# Patient Record
Sex: MALE | Race: WHITE | NOT HISPANIC OR LATINO | URBAN - METROPOLITAN AREA
[De-identification: names, ages, dates, MRNs, and addresses within clinical notes are randomized per-mention and may not be internally consistent; named-entity substitution may affect disease eponyms.]

---

## 2023-08-03 ENCOUNTER — INPATIENT (INPATIENT)
Facility: HOSPITAL | Age: 73
LOS: 3 days | Discharge: HOME | DRG: 872 | End: 2023-08-07
Attending: HOSPITALIST | Admitting: HOSPITALIST
Payer: MEDICARE

## 2023-08-03 VITALS
SYSTOLIC BLOOD PRESSURE: 129 MMHG | DIASTOLIC BLOOD PRESSURE: 78 MMHG | RESPIRATION RATE: 16 BRPM | TEMPERATURE: 100 F | OXYGEN SATURATION: 96 % | HEART RATE: 86 BPM

## 2023-08-03 LAB
ALBUMIN SERPL ELPH-MCNC: 4.7 G/DL — SIGNIFICANT CHANGE UP (ref 3.3–5)
ALP SERPL-CCNC: 46 U/L — SIGNIFICANT CHANGE UP (ref 40–120)
ALT FLD-CCNC: 19 U/L — SIGNIFICANT CHANGE UP (ref 10–45)
ANION GAP SERPL CALC-SCNC: 17 MMOL/L — SIGNIFICANT CHANGE UP (ref 5–17)
APTT BLD: 29.3 SEC — SIGNIFICANT CHANGE UP (ref 24.5–35.6)
AST SERPL-CCNC: 22 U/L — SIGNIFICANT CHANGE UP (ref 10–40)
BASE EXCESS BLDV CALC-SCNC: -0.8 MMOL/L — SIGNIFICANT CHANGE UP (ref -2–3)
BILIRUB SERPL-MCNC: 1.4 MG/DL — HIGH (ref 0.2–1.2)
BUN SERPL-MCNC: 18 MG/DL — SIGNIFICANT CHANGE UP (ref 7–23)
CA-I SERPL-SCNC: 1.17 MMOL/L — SIGNIFICANT CHANGE UP (ref 1.15–1.33)
CALCIUM SERPL-MCNC: 9.5 MG/DL — SIGNIFICANT CHANGE UP (ref 8.4–10.5)
CHLORIDE BLDV-SCNC: 100 MMOL/L — SIGNIFICANT CHANGE UP (ref 96–108)
CHLORIDE SERPL-SCNC: 100 MMOL/L — SIGNIFICANT CHANGE UP (ref 96–108)
CO2 BLDV-SCNC: 23 MMOL/L — SIGNIFICANT CHANGE UP (ref 22–26)
CO2 SERPL-SCNC: 18 MMOL/L — LOW (ref 22–31)
CREAT SERPL-MCNC: 1.08 MG/DL — SIGNIFICANT CHANGE UP (ref 0.5–1.3)
CRP SERPL-MCNC: 28 MG/L — HIGH (ref 0–4)
EGFR: 72 ML/MIN/1.73M2 — SIGNIFICANT CHANGE UP
GAS PNL BLDV: 131 MMOL/L — LOW (ref 136–145)
GAS PNL BLDV: SIGNIFICANT CHANGE UP
GAS PNL BLDV: SIGNIFICANT CHANGE UP
GLUCOSE BLDV-MCNC: 118 MG/DL — HIGH (ref 70–99)
GLUCOSE SERPL-MCNC: 123 MG/DL — HIGH (ref 70–99)
HCO3 BLDV-SCNC: 22 MMOL/L — SIGNIFICANT CHANGE UP (ref 22–29)
HCT VFR BLD CALC: 41.9 % — SIGNIFICANT CHANGE UP (ref 39–50)
HCT VFR BLDA CALC: 47 % — SIGNIFICANT CHANGE UP (ref 39–51)
HGB BLD CALC-MCNC: 15.5 G/DL — SIGNIFICANT CHANGE UP (ref 12.6–17.4)
HGB BLD-MCNC: 14.9 G/DL — SIGNIFICANT CHANGE UP (ref 13–17)
INR BLD: 1.1 RATIO — SIGNIFICANT CHANGE UP (ref 0.85–1.18)
LACTATE BLDV-MCNC: 1.5 MMOL/L — SIGNIFICANT CHANGE UP (ref 0.5–2)
MCHC RBC-ENTMCNC: 30.5 PG — SIGNIFICANT CHANGE UP (ref 27–34)
MCHC RBC-ENTMCNC: 35.6 GM/DL — SIGNIFICANT CHANGE UP (ref 32–36)
MCV RBC AUTO: 85.7 FL — SIGNIFICANT CHANGE UP (ref 80–100)
PCO2 BLDV: 31 MMHG — LOW (ref 42–55)
PH BLDV: 7.46 — HIGH (ref 7.32–7.43)
PLATELET # BLD AUTO: 213 K/UL — SIGNIFICANT CHANGE UP (ref 150–400)
PO2 BLDV: 57 MMHG — HIGH (ref 25–45)
POTASSIUM BLDV-SCNC: 3.7 MMOL/L — SIGNIFICANT CHANGE UP (ref 3.5–5.1)
POTASSIUM SERPL-MCNC: 4 MMOL/L — SIGNIFICANT CHANGE UP (ref 3.5–5.3)
POTASSIUM SERPL-SCNC: 4 MMOL/L — SIGNIFICANT CHANGE UP (ref 3.5–5.3)
PROT SERPL-MCNC: 7.5 G/DL — SIGNIFICANT CHANGE UP (ref 6–8.3)
PROTHROM AB SERPL-ACNC: 12.1 SEC — SIGNIFICANT CHANGE UP (ref 9.5–13)
RBC # BLD: 4.89 M/UL — SIGNIFICANT CHANGE UP (ref 4.2–5.8)
RBC # FLD: 12.3 % — SIGNIFICANT CHANGE UP (ref 10.3–14.5)
SAO2 % BLDV: 89.5 % — HIGH (ref 67–88)
SODIUM SERPL-SCNC: 135 MMOL/L — SIGNIFICANT CHANGE UP (ref 135–145)
WBC # BLD: 19.9 K/UL — HIGH (ref 3.8–10.5)
WBC # FLD AUTO: 19.9 K/UL — HIGH (ref 3.8–10.5)

## 2023-08-03 PROCEDURE — 71045 X-RAY EXAM CHEST 1 VIEW: CPT | Mod: 26

## 2023-08-03 PROCEDURE — 93971 EXTREMITY STUDY: CPT | Mod: 26,RT

## 2023-08-03 PROCEDURE — 99285 EMERGENCY DEPT VISIT HI MDM: CPT | Mod: GC

## 2023-08-03 PROCEDURE — 73080 X-RAY EXAM OF ELBOW: CPT | Mod: 26,RT

## 2023-08-03 RX ORDER — ACETAMINOPHEN 500 MG
650 TABLET ORAL ONCE
Refills: 0 | Status: COMPLETED | OUTPATIENT
Start: 2023-08-03 | End: 2023-08-03

## 2023-08-03 RX ADMIN — Medication 650 MILLIGRAM(S): at 23:21

## 2023-08-03 NOTE — ED ADULT TRIAGE NOTE - CHIEF COMPLAINT QUOTE
sudden onset right arm pain/swelling with episode of confusion an hour ago lasting approx 5-10 minutes. Son at bedside states patient is still not completely at baseline

## 2023-08-03 NOTE — ED ADULT NURSE NOTE - OBJECTIVE STATEMENT
pt is a 71yo male presenting to the ED complaining of confusion. pt states he experienced an episode of acute confusion around 2045 when he was trying to make a phone call, states he could not remember how to dial the phone to complete the call, states episode only lasted around 10 minutes before he returned to baseline mental status. pt son at bedside states pt was also having difficult time finishing sentences and responding to verbal stimuli with appropriate words, pt has since returned to baseline speech. pt also presents complaining of R forearm/elbow pain, backside of R forearm appears red, warm to touch, sensitive to tactile sensation due to presence of pain, area around elbow appears grossly swollen. code stroke called initially upon pt arrival, code stroke cancelled following MD Cowan and MD Rhodes assessment. pt A&Ox3 gross neuro intact, no difficulty speaking in complete sentences, s1s2 heart sounds heard, pulses x 4, canales x4, abdomen soft nontender nondistended, skin intact. pt denies chest pain, sob, ha, n/v/d, abdominal pain, f/c, urinary symptoms, hematuria. pt denies chest pain, sob, ha, n/v/d, abdominal pain, f/c, urinary symptoms, hematuria

## 2023-08-03 NOTE — ED PROVIDER NOTE - PROGRESS NOTE DETAILS
Darryn Deras, DO PGY-3: Patient signed out to me at 11 PM, patient had a brief episode of word finding difficulty that self resolved and also presented for right arm redness and elbow pain.  Patient with full range of motion of the arm passive and active, neurovascular intact but erythema extends from elbow distally to the anterior forearm that is tender and hyperemic.  Labs showing leukocytosis of nearly 20,000, no DVT in the right upper extremity, chest x-ray and elbow x-ray negative.  Low suspicion for septic joint or septic bursitis.  Likely cellulitis will treat with Ancef.  Patient pending CT imaging and admission for TIA and cellulitis.

## 2023-08-03 NOTE — ED PROVIDER NOTE - PHYSICAL EXAMINATION
GENERAL: Awake, alert, NAD  HEENT: NC/AT, moist mucous membranes, EOMI  LUNGS: CTAB, no wheezes or crackles   CARDIAC: RRR, no m/r/g  ABDOMEN: Soft, non tender, non distended, no rebound, no guarding  BACK: No midline spinal tenderness   EXT: RUE found with edema and erythema from elbow tracking distally towards forearm and hand. Pain with rom however no limited rom. warm and blanching to touch  NEURO: A&Ox3. Moving all extremities.  PSYCH: Normal affect.

## 2023-08-03 NOTE — ED ADULT NURSE NOTE - NSFALLUNIVINTERV_ED_ALL_ED
Bed/Stretcher in lowest position, wheels locked, appropriate side rails in place/Call bell, personal items and telephone in reach/Instruct patient to call for assistance before getting out of bed/chair/stretcher/Non-slip footwear applied when patient is off stretcher/[place] to call system/Physically safe environment - no spills, clutter or unnecessary equipment/Purposeful proactive rounding/Room/bathroom lighting operational, light cord in reach

## 2023-08-03 NOTE — ED PROVIDER NOTE - ATTENDING CONTRIBUTION TO CARE
Hx: from pt and son, pt with RIGHT elbow swelling pain and reddness, worsening today.  No other sxs, just localized to the elbow.  Pt also with episode of confusion with trouble dialing phone and trouble getting his words out when on the phone with his son, the words were clear but did not make sense and son felt that he was "searching for his words." lasted 10min, resolved fully.  No priors like this.  No diplopia, dysphagia, dysarthria, ataxia, focal weakness in arm or leg.     PE: well appearing, nontoxic, no respiratory distress.  NIH=0, normal speech.  RIGHT elbow swollen, tender, red streaking from elbow down forearm and slightly proximal as well, no crepitus.    MDM: R elbow cellulitis, likely bursitis infected, would also consider DVT, less likely osteomyelitis or septic joint, check labs, inflammatory markers, xray elbow, u/s arm r/o DVT.  Also with possible TIA, not TNK candidate, cth, cta head and neck.

## 2023-08-03 NOTE — ED PROVIDER NOTE - OBJECTIVE STATEMENT
73yo male pt who presents to the ED for expressive aphasia which lasted 10 mins. Patient brought in by son who endorses the patient was attempting to dial his daughter during the episode. At the time, the patient had word finding difficulty and difficulty dialing in his phone. The patient recalls event. Of note, patient with RUE edema, erythema and pain involving elbow and forearm. Denies fevers, chills, cp, sob or any other complaints

## 2023-08-03 NOTE — ED PROVIDER NOTE - DISPOSITION TYPE
Patient's caregiver, Veto Chen asks if the referral written on 7/21 for Home Health services from [facility]) be reissued as a generic order (without naming Haylee Pacheco), because the patient's insurer does not cover [facility]). Veto Chen will look for rehab provider who will take patient's insurance. ADMIT

## 2023-08-04 DIAGNOSIS — Z29.9 ENCOUNTER FOR PROPHYLACTIC MEASURES, UNSPECIFIED: ICD-10-CM

## 2023-08-04 DIAGNOSIS — E80.6 OTHER DISORDERS OF BILIRUBIN METABOLISM: ICD-10-CM

## 2023-08-04 DIAGNOSIS — L03.90 CELLULITIS, UNSPECIFIED: ICD-10-CM

## 2023-08-04 DIAGNOSIS — G45.9 TRANSIENT CEREBRAL ISCHEMIC ATTACK, UNSPECIFIED: ICD-10-CM

## 2023-08-04 LAB
A1C WITH ESTIMATED AVERAGE GLUCOSE RESULT: 5.5 % — SIGNIFICANT CHANGE UP (ref 4–5.6)
ALBUMIN SERPL ELPH-MCNC: 3.9 G/DL — SIGNIFICANT CHANGE UP (ref 3.3–5)
ALP SERPL-CCNC: 38 U/L — LOW (ref 40–120)
ALT FLD-CCNC: 14 U/L — SIGNIFICANT CHANGE UP (ref 10–45)
AMMONIA BLD-MCNC: 23 UMOL/L — SIGNIFICANT CHANGE UP (ref 11–55)
ANION GAP SERPL CALC-SCNC: 13 MMOL/L — SIGNIFICANT CHANGE UP (ref 5–17)
APPEARANCE UR: CLEAR — SIGNIFICANT CHANGE UP
AST SERPL-CCNC: 13 U/L — SIGNIFICANT CHANGE UP (ref 10–40)
BACTERIA # UR AUTO: NEGATIVE — SIGNIFICANT CHANGE UP
BASOPHILS # BLD AUTO: 0 K/UL — SIGNIFICANT CHANGE UP (ref 0–0.2)
BASOPHILS # BLD AUTO: 0.04 K/UL — SIGNIFICANT CHANGE UP (ref 0–0.2)
BASOPHILS NFR BLD AUTO: 0 % — SIGNIFICANT CHANGE UP (ref 0–2)
BASOPHILS NFR BLD AUTO: 0.2 % — SIGNIFICANT CHANGE UP (ref 0–2)
BILIRUB SERPL-MCNC: 1.6 MG/DL — HIGH (ref 0.2–1.2)
BILIRUB UR-MCNC: NEGATIVE — SIGNIFICANT CHANGE UP
BUN SERPL-MCNC: 19 MG/DL — SIGNIFICANT CHANGE UP (ref 7–23)
CALCIUM SERPL-MCNC: 8.7 MG/DL — SIGNIFICANT CHANGE UP (ref 8.4–10.5)
CHLORIDE SERPL-SCNC: 103 MMOL/L — SIGNIFICANT CHANGE UP (ref 96–108)
CHOLEST SERPL-MCNC: 148 MG/DL — SIGNIFICANT CHANGE UP
CK SERPL-CCNC: 87 U/L — SIGNIFICANT CHANGE UP (ref 30–200)
CO2 SERPL-SCNC: 20 MMOL/L — LOW (ref 22–31)
COLOR SPEC: SIGNIFICANT CHANGE UP
CREAT SERPL-MCNC: 1.18 MG/DL — SIGNIFICANT CHANGE UP (ref 0.5–1.3)
CULTURE RESULTS: SIGNIFICANT CHANGE UP
DIFF PNL FLD: ABNORMAL
EGFR: 65 ML/MIN/1.73M2 — SIGNIFICANT CHANGE UP
EOSINOPHIL # BLD AUTO: 0 K/UL — SIGNIFICANT CHANGE UP (ref 0–0.5)
EOSINOPHIL # BLD AUTO: 0.02 K/UL — SIGNIFICANT CHANGE UP (ref 0–0.5)
EOSINOPHIL NFR BLD AUTO: 0 % — SIGNIFICANT CHANGE UP (ref 0–6)
EOSINOPHIL NFR BLD AUTO: 0.1 % — SIGNIFICANT CHANGE UP (ref 0–6)
EPI CELLS # UR: 1 /HPF — SIGNIFICANT CHANGE UP
ERYTHROCYTE [SEDIMENTATION RATE] IN BLOOD: 23 MM/HR — HIGH (ref 0–20)
ESTIMATED AVERAGE GLUCOSE: 111 MG/DL — SIGNIFICANT CHANGE UP (ref 68–114)
FOLATE SERPL-MCNC: 8.1 NG/ML — SIGNIFICANT CHANGE UP
GLUCOSE SERPL-MCNC: 123 MG/DL — HIGH (ref 70–99)
GLUCOSE UR QL: NEGATIVE — SIGNIFICANT CHANGE UP
HCT VFR BLD CALC: 37.7 % — LOW (ref 39–50)
HCV AB S/CO SERPL IA: 0.07 S/CO — SIGNIFICANT CHANGE UP (ref 0–0.99)
HCV AB SERPL-IMP: SIGNIFICANT CHANGE UP
HDLC SERPL-MCNC: 38 MG/DL — LOW
HGB BLD-MCNC: 13.4 G/DL — SIGNIFICANT CHANGE UP (ref 13–17)
HYALINE CASTS # UR AUTO: 0 /LPF — SIGNIFICANT CHANGE UP (ref 0–2)
IMM GRANULOCYTES NFR BLD AUTO: 1.8 % — HIGH (ref 0–0.9)
KETONES UR-MCNC: NEGATIVE — SIGNIFICANT CHANGE UP
LACTATE SERPL-SCNC: 1.2 MMOL/L — SIGNIFICANT CHANGE UP (ref 0.5–2)
LEUKOCYTE ESTERASE UR-ACNC: NEGATIVE — SIGNIFICANT CHANGE UP
LIPID PNL WITH DIRECT LDL SERPL: 98 MG/DL — SIGNIFICANT CHANGE UP
LYMPHOCYTES # BLD AUTO: 1.05 K/UL — SIGNIFICANT CHANGE UP (ref 1–3.3)
LYMPHOCYTES # BLD AUTO: 1.4 K/UL — SIGNIFICANT CHANGE UP (ref 1–3.3)
LYMPHOCYTES # BLD AUTO: 5.3 % — LOW (ref 13–44)
LYMPHOCYTES # BLD AUTO: 8.1 % — LOW (ref 13–44)
MAGNESIUM SERPL-MCNC: 1.8 MG/DL — SIGNIFICANT CHANGE UP (ref 1.6–2.6)
MANUAL SMEAR VERIFICATION: SIGNIFICANT CHANGE UP
MCHC RBC-ENTMCNC: 31.1 PG — SIGNIFICANT CHANGE UP (ref 27–34)
MCHC RBC-ENTMCNC: 35.5 GM/DL — SIGNIFICANT CHANGE UP (ref 32–36)
MCV RBC AUTO: 87.5 FL — SIGNIFICANT CHANGE UP (ref 80–100)
MONOCYTES # BLD AUTO: 1.05 K/UL — HIGH (ref 0–0.9)
MONOCYTES # BLD AUTO: 1.53 K/UL — HIGH (ref 0–0.9)
MONOCYTES NFR BLD AUTO: 5.3 % — SIGNIFICANT CHANGE UP (ref 2–14)
MONOCYTES NFR BLD AUTO: 8.9 % — SIGNIFICANT CHANGE UP (ref 2–14)
NEUTROPHILS # BLD AUTO: 13.91 K/UL — HIGH (ref 1.8–7.4)
NEUTROPHILS # BLD AUTO: 17.79 K/UL — HIGH (ref 1.8–7.4)
NEUTROPHILS NFR BLD AUTO: 80.9 % — HIGH (ref 43–77)
NEUTROPHILS NFR BLD AUTO: 89.4 % — HIGH (ref 43–77)
NITRITE UR-MCNC: NEGATIVE — SIGNIFICANT CHANGE UP
NON HDL CHOLESTEROL: 110 MG/DL — SIGNIFICANT CHANGE UP
NRBC # BLD: 0 /100 WBCS — SIGNIFICANT CHANGE UP (ref 0–0)
PH UR: 6 — SIGNIFICANT CHANGE UP (ref 5–8)
PHOSPHATE SERPL-MCNC: 2.6 MG/DL — SIGNIFICANT CHANGE UP (ref 2.5–4.5)
PLAT MORPH BLD: NORMAL — SIGNIFICANT CHANGE UP
PLATELET # BLD AUTO: 170 K/UL — SIGNIFICANT CHANGE UP (ref 150–400)
POTASSIUM SERPL-MCNC: 3.8 MMOL/L — SIGNIFICANT CHANGE UP (ref 3.5–5.3)
POTASSIUM SERPL-SCNC: 3.8 MMOL/L — SIGNIFICANT CHANGE UP (ref 3.5–5.3)
PROT SERPL-MCNC: 6.2 G/DL — SIGNIFICANT CHANGE UP (ref 6–8.3)
PROT UR-MCNC: ABNORMAL
RAPID RVP RESULT: SIGNIFICANT CHANGE UP
RBC # BLD: 4.31 M/UL — SIGNIFICANT CHANGE UP (ref 4.2–5.8)
RBC # FLD: 12.5 % — SIGNIFICANT CHANGE UP (ref 10.3–14.5)
RBC BLD AUTO: NORMAL — SIGNIFICANT CHANGE UP
RBC CASTS # UR COMP ASSIST: 1 /HPF — SIGNIFICANT CHANGE UP (ref 0–4)
SARS-COV-2 RNA SPEC QL NAA+PROBE: SIGNIFICANT CHANGE UP
SODIUM SERPL-SCNC: 136 MMOL/L — SIGNIFICANT CHANGE UP (ref 135–145)
SP GR SPEC: 1.03 — HIGH (ref 1.01–1.02)
SPECIMEN SOURCE: SIGNIFICANT CHANGE UP
TRIGL SERPL-MCNC: 56 MG/DL — SIGNIFICANT CHANGE UP
UROBILINOGEN FLD QL: NEGATIVE — SIGNIFICANT CHANGE UP
VIT B12 SERPL-MCNC: 218 PG/ML — LOW (ref 232–1245)
VIT D25+D1,25 OH+D1,25 PNL SERPL-MCNC: 26.5 PG/ML — SIGNIFICANT CHANGE UP (ref 19.9–79.3)
WBC # BLD: 17.21 K/UL — HIGH (ref 3.8–10.5)
WBC # FLD AUTO: 17.21 K/UL — HIGH (ref 3.8–10.5)
WBC UR QL: 1 /HPF — SIGNIFICANT CHANGE UP (ref 0–5)

## 2023-08-04 PROCEDURE — 93356 MYOCRD STRAIN IMG SPCKL TRCK: CPT

## 2023-08-04 PROCEDURE — 99223 1ST HOSP IP/OBS HIGH 75: CPT | Mod: GC

## 2023-08-04 PROCEDURE — 93306 TTE W/DOPPLER COMPLETE: CPT | Mod: 26

## 2023-08-04 PROCEDURE — 70498 CT ANGIOGRAPHY NECK: CPT | Mod: 26,MA

## 2023-08-04 PROCEDURE — 12345: CPT | Mod: NC,GC

## 2023-08-04 PROCEDURE — 70496 CT ANGIOGRAPHY HEAD: CPT | Mod: 26,MA

## 2023-08-04 PROCEDURE — 70450 CT HEAD/BRAIN W/O DYE: CPT | Mod: 26,MA,XU

## 2023-08-04 RX ORDER — ASPIRIN/CALCIUM CARB/MAGNESIUM 324 MG
325 TABLET ORAL
Refills: 0 | Status: DISCONTINUED | OUTPATIENT
Start: 2023-08-04 | End: 2023-08-07

## 2023-08-04 RX ORDER — LANOLIN ALCOHOL/MO/W.PET/CERES
3 CREAM (GRAM) TOPICAL AT BEDTIME
Refills: 0 | Status: DISCONTINUED | OUTPATIENT
Start: 2023-08-04 | End: 2023-08-07

## 2023-08-04 RX ORDER — SODIUM CHLORIDE 9 MG/ML
1000 INJECTION INTRAMUSCULAR; INTRAVENOUS; SUBCUTANEOUS ONCE
Refills: 0 | Status: COMPLETED | OUTPATIENT
Start: 2023-08-04 | End: 2023-08-04

## 2023-08-04 RX ORDER — ACETAMINOPHEN 500 MG
650 TABLET ORAL EVERY 6 HOURS
Refills: 0 | Status: DISCONTINUED | OUTPATIENT
Start: 2023-08-04 | End: 2023-08-07

## 2023-08-04 RX ORDER — CEFAZOLIN SODIUM 1 G
2000 VIAL (EA) INJECTION EVERY 8 HOURS
Refills: 0 | Status: DISCONTINUED | OUTPATIENT
Start: 2023-08-04 | End: 2023-08-07

## 2023-08-04 RX ORDER — PREGABALIN 225 MG/1
1000 CAPSULE ORAL DAILY
Refills: 0 | Status: DISCONTINUED | OUTPATIENT
Start: 2023-08-04 | End: 2023-08-07

## 2023-08-04 RX ORDER — CEFAZOLIN SODIUM 1 G
2000 VIAL (EA) INJECTION EVERY 8 HOURS
Refills: 0 | Status: DISCONTINUED | OUTPATIENT
Start: 2023-08-04 | End: 2023-08-04

## 2023-08-04 RX ORDER — IBUPROFEN 200 MG
800 TABLET ORAL EVERY 8 HOURS
Refills: 0 | Status: COMPLETED | OUTPATIENT
Start: 2023-08-04 | End: 2023-08-05

## 2023-08-04 RX ORDER — CEFAZOLIN SODIUM 1 G
1000 VIAL (EA) INJECTION EVERY 8 HOURS
Refills: 0 | Status: DISCONTINUED | OUTPATIENT
Start: 2023-08-04 | End: 2023-08-04

## 2023-08-04 RX ORDER — CEFAZOLIN SODIUM 1 G
1000 VIAL (EA) INJECTION ONCE
Refills: 0 | Status: COMPLETED | OUTPATIENT
Start: 2023-08-04 | End: 2023-08-04

## 2023-08-04 RX ORDER — ASPIRIN/CALCIUM CARB/MAGNESIUM 324 MG
81 TABLET ORAL DAILY
Refills: 0 | Status: DISCONTINUED | OUTPATIENT
Start: 2023-08-04 | End: 2023-08-04

## 2023-08-04 RX ADMIN — Medication 650 MILLIGRAM(S): at 20:08

## 2023-08-04 RX ADMIN — Medication 650 MILLIGRAM(S): at 10:50

## 2023-08-04 RX ADMIN — Medication 800 MILLIGRAM(S): at 22:22

## 2023-08-04 RX ADMIN — Medication 81 MILLIGRAM(S): at 11:57

## 2023-08-04 RX ADMIN — Medication 100 MILLIGRAM(S): at 11:58

## 2023-08-04 RX ADMIN — PREGABALIN 1000 MICROGRAM(S): 225 CAPSULE ORAL at 11:58

## 2023-08-04 RX ADMIN — Medication 650 MILLIGRAM(S): at 21:08

## 2023-08-04 RX ADMIN — Medication 800 MILLIGRAM(S): at 21:22

## 2023-08-04 RX ADMIN — SODIUM CHLORIDE 1000 MILLILITER(S): 9 INJECTION INTRAMUSCULAR; INTRAVENOUS; SUBCUTANEOUS at 01:28

## 2023-08-04 RX ADMIN — Medication 100 MILLIGRAM(S): at 21:23

## 2023-08-04 RX ADMIN — Medication 100 MILLIGRAM(S): at 15:10

## 2023-08-04 RX ADMIN — Medication 100 MILLIGRAM(S): at 01:27

## 2023-08-04 NOTE — CONSULT NOTE ADULT - SUBJECTIVE AND OBJECTIVE BOX
Neurology - Consult Note    -  Spectra: [id number] ([facility]), [phone] ([facility])  -    HPI: Patient FERNANDO PARDO is a 72y (1950) RH man with a PMHx significant for HTN on asprin daily who was brought into the hospital for an episode of confusion and right arm pain. Neurology consulted for episode of confusion. Pt was with family and suddenly had word finding difficulty difficulty using his phone, lasting around 10 min. This was not accompanied by slurred speech, facial droop, HA, numbness, weakness, dizziness, change in vision or gait. No prior similar episodes. Patient currently with evolving erythema on RUE with tenderness and edema on elbow. Endorses chills but no fevers (although did not take his temperature. Patient currently back to his baseline.   NIHSS: 0  Baseline mRS: 0    Not a tenecteplase candidate due to presentation outside the window.   Not a candidate for thrombectomy due to no LVO on imaging.       Review of Systems:     All other review of systems is negative unless indicated above.    Allergies:  penicillins (Rash)      PMHx/PSHx/Family Hx: As above, otherwise see below       Social Hx:  No reported use of tobacco, alcohol, or illicit drugs    Medications:  MEDICATIONS  (STANDING):    MEDICATIONS  (PRN):        Home Medications:      Vitals:  T(C): 36.9 (08-04-23 @ 03:23), Max: 38 (08-03-23 @ 22:37)  HR: 68 (08-04-23 @ 03:23) (68 - 87)  BP: 102/64 (08-04-23 @ 03:23) (102/64 - 158/90)  RR: 16 (08-04-23 @ 03:23) (16 - 18)  SpO2: 96% (08-04-23 @ 03:23) (96% - 98%)    Physical Examination:    General - NAD  Cardiovascular - Peripheral pulses palpable, no edema    Neurologic Exam:  Mental status - Awake, Alert, Oriented to person, place, and time. Speech fluent, repetition and naming intact. Follows simple and complex commands. attention, concentration and fund of knowledge intact.     Cranial nerves - PERRL, VFF, EOMI, face sensation (V1-V3) intact LT, No facial asymmetry b/l, hearing grossly intact b/l, palate with symmetric elevation, trapezius 5/5 strength b/l, tongue midline on protrusion with full lateral movement    Motor - Normal bulk and tone throughout. No pronator drift.  Strength testing            Deltoid      Biceps      Triceps     Wrist Extension    Wrist Flexion       R            5                 5               5                     5                              5                        5  L             5                 5               5                     5                              5                       5              Hip Flexion    Hip Extension    Knee Flexion    Knee Extension    Dorsiflexion    Plantar Flexion  R              5                           5                       5                           5                            5                          5  L              5                           5                        5                           5                            5                          5     RUE edema, erythema and pain involving elbow and forearm.  Pain with rom however no limited rom. warm and blanching to touch  neg hoffmans b/l    Sensation - Light touch/temperature intact throughout    DTR's -             Biceps      Triceps     Brachioradialis      Patellar    Ankle    Toes/plantar response  R             2+             2+                  2+                       2+            2+                 Down  L              2+             2+                 2+                        2+           2+                 Down    Coordination - Finger to Nose and heal to shin intact b/l. No tremors appreciated    Gait and station - Normal casual gait. Romberg (-)    Labs:                        14.9   19.90 )-----------( 213      ( 03 Aug 2023 23:14 )             41.9     08-03    135  |  100  |  18  ----------------------------<  123<H>  4.0   |  18<L>  |  1.08    Ca    9.5      03 Aug 2023 23:14    TPro  7.5  /  Alb  4.7  /  TBili  1.4<H>  /  DBili  x   /  AST  22  /  ALT  19  /  AlkPhos  46  08-03    CAPILLARY BLOOD GLUCOSE  129 (04 Aug 2023 02:14)      POCT Blood Glucose.: 129 mg/dL (03 Aug 2023 22:40)    LIVER FUNCTIONS - ( 03 Aug 2023 23:14 )  Alb: 4.7 g/dL / Pro: 7.5 g/dL / ALK PHOS: 46 U/L / ALT: 19 U/L / AST: 22 U/L / GGT: x             PT/INR - ( 03 Aug 2023 23:14 )   PT: 12.1 sec;   INR: 1.10 ratio         PTT - ( 03 Aug 2023 23:14 )  PTT:29.3 sec       Radiology:  CT Head No Cont:  (04 Aug 2023 00:59)    < from: CT Head No Cont (08.04.23 @ 00:59) >  IMPRESSION:    CT HEAD: No acute intra-cranial hemorrhage, mass effect, or midline shift.    CTA BRAIN: Patent intracranial circulation.No proximal stenosis or   aneurysm.    CTA NECK: Patent cervical vasculature. No hemodynamically significant   stenosis by NASCET criteria or dissection.    < end of copied text >   Neurology - Consult Note    -  Spectra: [phone] ([facility]), [phone] ([facility])  -    HPI: Patient FERNANDO PARDO is a 72y (1950) RH man with a PMHx significant for HTN on asprin daily who was brought into the hospital for an episode of confusion and right arm pain. Neurology consulted for episode of confusion. Pt was with family and suddenly had word finding difficulty difficulty using his phone, lasting around 10 min. This was not accompanied by slurred speech, facial droop, HA, numbness, weakness, dizziness, change in vision or gait. No prior similar episodes. Patient currently with evolving erythema on RUE with tenderness and edema on elbow. Endorses chills but no fevers (although did not take his temperature. Patient currently back to his baseline.   NIHSS: 0  Baseline mRS: 0    Not a tenecteplase candidate due to presentation outside the window.   Not a candidate for thrombectomy due to no LVO on imaging.       Review of Systems:     All other review of systems is negative unless indicated above.    Allergies:  penicillins (Rash)      PMHx/PSHx/Family Hx: As above, otherwise see below       Social Hx:  No reported use of tobacco, alcohol, or illicit drugs    Medications:  MEDICATIONS  (STANDING):    MEDICATIONS  (PRN):        Home Medications:      Vitals:  T(C): 36.9 (08-04-23 @ 03:23), Max: 38 (08-03-23 @ 22:37)  HR: 68 (08-04-23 @ 03:23) (68 - 87)  BP: 102/64 (08-04-23 @ 03:23) (102/64 - 158/90)  RR: 16 (08-04-23 @ 03:23) (16 - 18)  SpO2: 96% (08-04-23 @ 03:23) (96% - 98%)    Physical Examination:    General - NAD  Cardiovascular - Peripheral pulses palpable, no edema  Eyes - Fundoscopy not well visualized    Neurologic Exam:  Mental status - Awake, Alert, Oriented to person, place, and time. Speech fluent, repetition and naming intact. Follows simple and complex commands. attention/concentration, recent and remote memory, and fund of knowledge intact.     Cranial nerves - PERRL, VFF, EOMI, face sensation (V1-V3) intact LT, No facial asymmetry b/l, hearing grossly intact b/l, palate with symmetric elevation, trapezius 5/5 strength b/l, tongue midline on protrusion with full lateral movement    Motor - Normal bulk and tone throughout. No pronator drift.  Strength testing            Deltoid      Biceps      Triceps     Wrist Extension    Wrist Flexion       R            5                 5               5                     5                              5                        5  L             5                 5               5                     5                              5                       5              Hip Flexion    Hip Extension    Knee Flexion    Knee Extension    Dorsiflexion    Plantar Flexion  R              5                           5                       5                           5                            5                          5  L              5                           5                        5                           5                            5                          5     RUE edema, erythema and pain involving elbow and forearm.  Pain with rom however no limited rom. warm and blanching to touch  neg hoffmans b/l    Sensation - Light touch/temperature intact throughout    DTR's -             Biceps      Triceps     Brachioradialis      Patellar    Ankle    Toes/plantar response  R             2+             2+                  2+                       2+            2+                 Down  L              2+             2+                 2+                        2+           2+                 Down    Coordination - Finger to Nose and heal to shin intact b/l. No tremors appreciated    Gait and station - Normal casual gait. Romberg (-)    Labs:                        14.9   19.90 )-----------( 213      ( 03 Aug 2023 23:14 )             41.9     08-03    135  |  100  |  18  ----------------------------<  123<H>  4.0   |  18<L>  |  1.08    Ca    9.5      03 Aug 2023 23:14    TPro  7.5  /  Alb  4.7  /  TBili  1.4<H>  /  DBili  x   /  AST  22  /  ALT  19  /  AlkPhos  46  08-03    CAPILLARY BLOOD GLUCOSE  129 (04 Aug 2023 02:14)      POCT Blood Glucose.: 129 mg/dL (03 Aug 2023 22:40)    LIVER FUNCTIONS - ( 03 Aug 2023 23:14 )  Alb: 4.7 g/dL / Pro: 7.5 g/dL / ALK PHOS: 46 U/L / ALT: 19 U/L / AST: 22 U/L / GGT: x             PT/INR - ( 03 Aug 2023 23:14 )   PT: 12.1 sec;   INR: 1.10 ratio         PTT - ( 03 Aug 2023 23:14 )  PTT:29.3 sec       Radiology:  CT Head No Cont:  (04 Aug 2023 00:59)    < from: CT Head No Cont (08.04.23 @ 00:59) >  IMPRESSION:    CT HEAD: No acute intra-cranial hemorrhage, mass effect, or midline shift.    CTA BRAIN: Patent intracranial circulation.No proximal stenosis or   aneurysm.    CTA NECK: Patent cervical vasculature. No hemodynamically significant   stenosis by NASCET criteria or dissection.    < end of copied text >

## 2023-08-04 NOTE — H&P ADULT - NSHPPHYSICALEXAM_GEN_ALL_CORE
PHYSICAL EXAM:   GENERAL: Alert. Not confused. No acute distress. Not thin. Not cachectic. Not obese.  HEAD:  Atraumatic. Normocephalic.  EYES: EOMI. PERRLA. Normal conjunctiva/sclera.  ENT: Neck supple. No JVD. Moist oral mucosa. Not edentulous. No thrush.  LYMPH: Normal supraclavicular/cervical lymph nodes.   CARDIAC: Not tachy, Not erin. Regular rhythm. Not irregularly irregular. S1. S2. No murmur. No rub. No distant heart sounds.  LUNG/CHEST: CTAB. BS equal bilaterally. No wheezes. No rales. No rhonchi.  ABDOMEN: Soft. No tenderness. No distension. No fluid wave. Normal bowel sounds.  BACK: No midline/vertebral tenderness. No flank tenderness.  VASCULAR: +2 b/l radial or ulnar pulses. Palpable DP pulses.  EXTREMITIES:  No clubbing. No cyanosis. No edema. Moving all 4.  NEUROLOGY: A&Ox3. Non-focal exam. Cranial nerves intact. Normal speech. Sensation intact.  PSYCH: Normal behavior. Normal affect.   SKIN: No jaundice. No erythema. No rash/lesion.  ICU Vital Signs Last 24 Hrs  T(C): 36.9 (04 Aug 2023 03:23), Max: 38 (03 Aug 2023 22:37)  T(F): 98.5 (04 Aug 2023 03:23), Max: 100.4 (03 Aug 2023 22:37)  HR: 68 (04 Aug 2023 03:23) (68 - 87)  BP: 102/64 (04 Aug 2023 03:23) (102/64 - 158/90)  BP(mean): --  ABP: --  ABP(mean): --  RR: 16 (04 Aug 2023 03:23) (16 - 18)  SpO2: 96% (04 Aug 2023 03:23) (96% - 98%)    O2 Parameters below as of 04 Aug 2023 03:23  Patient On (Oxygen Delivery Method): room air          I&O's Summary PHYSICAL EXAM:   GENERAL: Alert. Not confused. No acute distress. Not thin. Not cachectic. Not obese.  HEAD:  Atraumatic. Normocephalic.  EYES: EOMI. PERRLA. Normal conjunctiva/sclera.  ENT: Neck supple. No JVD. Moist oral mucosa. Not edentulous. No thrush.  LYMPH: Normal supraclavicular/cervical lymph nodes.   CARDIAC: Not tachy, Not erin. Regular rhythm. Not irregularly irregular. S1. S2. No murmur. No rub. No distant heart sounds.  LUNG/CHEST: CTAB. BS equal bilaterally. No wheezes. No rales. No rhonchi.  ABDOMEN: Soft. No tenderness. No distension. No fluid wave. Normal bowel sounds.  BACK: No midline/vertebral tenderness. No flank tenderness.  VASCULAR: +2 b/l radial or ulnar pulses. Palpable DP pulses.  EXTREMITIES:  No clubbing. No cyanosis. No edema. Moving all 4.  NEUROLOGY: A&Ox3. Non-focal exam. Cranial nerves intact. Normal speech. Sensation intact.  PSYCH: Normal behavior. Normal affect.   SKIN: No jaundice. (+) Area of erythema from mid-forearm to ~2cm proximal to elbow - demarcated by skin marker. Overlying edema affecting elbow, warm to touch and tender to palpation in that area as well.  ICU Vital Signs Last 24 Hrs  T(C): 36.9 (04 Aug 2023 03:23), Max: 38 (03 Aug 2023 22:37)  T(F): 98.5 (04 Aug 2023 03:23), Max: 100.4 (03 Aug 2023 22:37)  HR: 68 (04 Aug 2023 03:23) (68 - 87)  BP: 102/64 (04 Aug 2023 03:23) (102/64 - 158/90)  BP(mean): --  ABP: --  ABP(mean): --  RR: 16 (04 Aug 2023 03:23) (16 - 18)  SpO2: 96% (04 Aug 2023 03:23) (96% - 98%)    O2 Parameters below as of 04 Aug 2023 03:23  Patient On (Oxygen Delivery Method): room air          I&O's Summary PHYSICAL EXAM:   GENERAL: Alert. Not confused. No acute distress. Not thin. Not cachectic. Not obese.  HEAD:  Atraumatic. Normocephalic.  EYES: EOMI. PERRLA. Normal conjunctiva/sclera.  ENT: Neck supple. No JVD. Moist oral mucosa. Not edentulous. No thrush.  LYMPH: Normal supraclavicular/cervical lymph nodes.   CARDIAC: Not tachy, Not erin. Regular rhythm. Not irregularly irregular. S1. S2. No murmur. No rub. No distant heart sounds.  LUNG/CHEST: CTAB. BS equal bilaterally. No wheezes. No rales. No rhonchi.  ABDOMEN: Soft. No tenderness. No distension. No fluid wave. Normal bowel sounds.  BACK: No midline/vertebral tenderness. No flank tenderness.  VASCULAR: +2 b/l radial or ulnar pulses. Palpable DP pulses.  EXTREMITIES:  No clubbing. No cyanosis.  Moving all 4.  NEUROLOGY: A&Ox3. Non-focal exam. Cranial nerves intact. Normal speech. Sensation intact.  PSYCH: Normal behavior. Normal affect.   SKIN: No jaundice. (+) Area of erythema from mid-forearm to ~2cm proximal to elbow - demarcated by skin marker. Overlying edema affecting elbow, warm to touch and tender to palpation in that area as well.  ICU Vital Signs Last 24 Hrs  T(C): 36.9 (04 Aug 2023 03:23), Max: 38 (03 Aug 2023 22:37)  T(F): 98.5 (04 Aug 2023 03:23), Max: 100.4 (03 Aug 2023 22:37)  HR: 68 (04 Aug 2023 03:23) (68 - 87)  BP: 102/64 (04 Aug 2023 03:23) (102/64 - 158/90)  BP(mean): --  ABP: --  ABP(mean): --  RR: 16 (04 Aug 2023 03:23) (16 - 18)  SpO2: 96% (04 Aug 2023 03:23) (96% - 98%)    O2 Parameters below as of 04 Aug 2023 03:23  Patient On (Oxygen Delivery Method): room air          I&O's Summary

## 2023-08-04 NOTE — CONSULT NOTE ADULT - ASSESSMENT
72y (1950) RH man with a PMHx significant for HTN on asprin daily who was brought into the hospital for an episode of confusion and right arm pain. Neurology consulted for episode of confusion. Pt was with family and suddenly had word finding difficulty difficulty using his phone, lasting around 10 min. This was not accompanied by slurred speech, facial droop, HA, numbness, weakness, dizziness, change in vision or gait. No prior similar episodes. Patient currently with evolving erythema on RUE with tenderness and edema on elbow. Endorses chills but no fevers (although did not take his temperature. Patient currently back to his baseline.   NIHSS: 0    Impression: Brief episode of confusion that resolved in the setting of current infection (likely cellulitis). DDX includes TGA possibly 2/2 intense pain or changes in body temperature as a trigger (mean duration of episodes is approximately six hours and most clinical symptoms last a few hours and by definition resolve within 24 hours) vs infectious disturbance. Noted leukocytosis 19.9  Recommendations:  UA neg   MRI brain w/o  Check TSH, vitamin B1, B6, B12, folate, lactate, Vitamin D 25 OH, creatinine kinase, ammonia,   C/w home aspirin  ABX per primary     Case to be seen by attending.  Note incomplete until finalized by attending signature/attestation. 
Patient is a 72 year old male with no past medical history presenting after having difficulty using phone while attempting to contact physician daughter, episode lasting ~10 minutes and was admitted for AMS, rule out stroke and for RUE cellulitis.     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  [facility], Division of Infectious Diseases  [phone]  After 5pm on weekdays and all day on weekends - please call [phone]

## 2023-08-04 NOTE — CONSULT NOTE ADULT - SUBJECTIVE AND OBJECTIVE BOX
[facility] DIVISION OF INFECTIOUS DISEASES  REDD Escamilla S. Shah, Y. Patel, G. [facility]  [phone]  ([phone] - weekdays after 5pm and weekends)    FERNANDO PARDO  72y, Male  [id number]    HPI:  Patient is a 72 year old male with no past medical history presenting after having difficulty using phone while attempting to contact physician daughter, episode lasting ~10 minutes. No dysarthria or weakness at time of episode. Additionally, pt with 1 day history of RUE rash. Rash initially started mid-forearm, painful but non-pruritic in nature, and has progressed proximally towards the elbow. He also notes worsening warmth and edema with the rash. Pt is R handed w/ distant history of tennis-playing w/ R hand (transitioned to L hand following shoulder injury). He endorses chills over the past day and gardens every other day. Pt attributed rash initially to bug bite but is unsure. He denies trauma, fall, sore throat, dysphagia, odynophagia, cough, dyspnea, abdominal pain, nausea, vomiting, urinary symptoms, alternative joint involvement, myalgia.   In ED, VS: 100.4, HR 86, /78, RR 16, O2 96%. Administered ancef, NS bolus x1, tylenol. Code stroke called on arrival to ED, NIHSS of 0 at time. CTH completed w/ microangiopathic changes but no indication of acute ischemic/hemorrhagic stroke.  (04 Aug 2023 06:32)  ROS: 14 point review of systems completed, pertinent positives and negatives as per HPI.    Allergies: penicillins (Rash)  PMH -- No pertinent past medical history  PSH -- No significant past surgical history  FH -- No pertinent family history in first degree relatives  Social History -- denies tobacco, alcohol or illicit drug use    Physical Exam--  Vital Signs Last 24 Hrs  T(F): 98.1 (04 Aug 2023 07:54), Max: 100.4 (03 Aug 2023 22:37)  HR: 63 (04 Aug 2023 07:54) (63 - 87)  BP: 116/75 (04 Aug 2023 07:54) (102/64 - 158/90)  RR: 15 (04 Aug 2023 07:54) (15 - 18)  SpO2: 96% (04 Aug 2023 07:54) (96% - 98%)  General: no acute distress  HEENT: NC/AT, EOMI, anicteric, neck supple  Lungs: Clear bilaterally without rales, wheezing or rhonchi  Heart: S1, S2 present, RRR. No murmur, rub or gallop.  Abdomen: Soft. Nondistended. Nontender. BS present.   Neuro: AAOx3, no obvious focal deficits   Extremities: No cyanosis or clubbing. No edema.   Skin: Warm. Dry. Good turgor. **  Psychiatric: Appropriate affect and mood for situation.   Lines: PIV    Laboratory & Imaging Data--  CBC:                       13.4   17.21 )-----------( 170      ( 04 Aug 2023 07:56 )             37.7     WBC Count: 17.21 K/uL (23 @ 07:56)  WBC Count: 19.90 K/uL (23 @ 23:14)    CMP:     135  |  100  |  18  ----------------------------<  123<H>  4.0   |  18<L>  |  1.08    Ca    9.5      03 Aug 2023 23:14    TPro  7.5  /  Alb  4.7  /  TBili  1.4<H>  /  DBili  x   /  AST  22  /  ALT  19  /  AlkPhos  46  08-03    LIVER FUNCTIONS - ( 03 Aug 2023 23:14 )  Alb: 4.7 g/dL / Pro: 7.5 g/dL / ALK PHOS: 46 U/L / ALT: 19 U/L / AST: 22 U/L / GGT: x           Urinalysis Basic - ( 04 Aug 2023 01:22 )  Color: Light Yellow / Appearance: Clear / S.031 / pH: x  Gluc: x / Ketone: Negative  / Bili: Negative / Urobili: Negative   Blood: x / Protein: Trace / Nitrite: Negative   Leuk Esterase: Negative / RBC: 1 /hpf / WBC 1 /HPF   Sq Epi: x / Non Sq Epi: x / Bacteria: Negative    Microbiology: reviewed  Respiratory Viral Panel with COVID-19 by JACOB (23 @ 23:19)    Rapid RVP Result: NotDetec   SARS-CoV-2: NotDetec: This Respiratory Panel uses polymerase chain reaction (PCR) to detect for  adenovirus; coronavirus (HKU1, NL63, 229E, OC43); human metapneumovirus  (hMPV); human enterovirus/rhinovirus (Entero/RV); influenza A; influenza  A/H1; influenza A/H3; influenza A/H1-2009; influenza B; parainfluenza  viruses 1, 2, 3, 4; respiratory syncytial virus; Mycoplasma pneumoniae;  Chlamydophila pneumoniae; and SARS-CoV-2.    Radiology--reviewed  < from: CT Head No Cont (23 @ 00:59) >  IMPRESSION:    CT HEAD: No acute intra-cranial hemorrhage, mass effect, or midline shift.    CTA BRAIN: Patent intracranial circulation.No proximal stenosis or   aneurysm.    CTA NECK: Patent cervical vasculature. No hemodynamically significant   stenosis by NASCET criteria or dissection.    NASCET CAROTID STENOSIS CRITERIA (distal normal appearing ICA as   denominator for measurement): 0%-none, 1-49%-mild, 50-70%-moderate,   70-89%-severe, 90-99%-critical.    < end of copied text >  < from: VA Duplex Upper Ext Vein Scan, Right (23 @ 23:52) >  IMPRESSION:  No evidence of right upper extremity deep venous thrombosis.    < end of copied text >  < from: Xray Elbow AP + Lateral + Oblique, Right (23 @ 23:19) >  IMPRESSION:  No osseous erosion to suggest acute osteomyelitis. No joint effusion.    < end of copied text >  < from: Xray Chest 1 View AP/PA (23 @ 23:19) >    IMPRESSION:  Clear lungs.    < end of copied text >    Active Medications--  aspirin enteric coated 81 milliGRAM(s) Oral daily  ceFAZolin   IVPB 1000 milliGRAM(s) IV Intermittent every 8 hours  melatonin 3 milliGRAM(s) Oral at bedtime PRN    Current Antimicrobials:   ceFAZolin   IVPB 1000 milliGRAM(s) IV Intermittent every 8 hours    Prior/Completed Antimicrobials:  ceFAZolin   IVPB     [facility] DIVISION OF INFECTIOUS DISEASES  REDD Escamilla S. Shah, Y. Patel, G. [facility]  [phone]  ([phone] - weekdays after 5pm and weekends)    FERNANDO PARDO  72y, Male  [id number]    HPI:  Patient is a 72 year old male with no past medical history presenting after having difficulty using phone while attempting to contact physician daughter, episode lasting ~10 minutes. No dysarthria or weakness at time of episode. Additionally, pt with 1 day history of RUE rash. Rash initially started mid-forearm, painful but non-pruritic in nature, and has progressed proximally towards the elbow. He also notes worsening warmth and edema with the rash. Pt is R handed w/ distant history of tennis-playing w/ R hand (transitioned to L hand following shoulder injury). He endorses chills over the past day and gardens every other day. Pt attributed rash initially to bug bite but is unsure. He denies trauma, fall, sore throat, dysphagia, odynophagia, cough, dyspnea, abdominal pain, nausea, vomiting, urinary symptoms, alternative joint involvement, myalgia.   In ED, VS: 100.4, HR 86, /78, RR 16, O2 96%. Administered ancef, NS bolus x1, tylenol. Code stroke called on arrival to ED, NIHSS of 0 at time. CTH completed w/ microangiopathic changes but no indication of acute ischemic/hemorrhagic stroke.  (04 Aug 2023 06:32)  Patient seen and examined at bedside this morning in the ER. Patient confirms above history. States he does feel mental status is back to baseline, not having difficulty with word finding. He feels RUE erythema and swelling is about the same. Has not had fevers this am. No other complaints. Reports having small healing cuts on fingertips from dry skin. Son at bedside.   ROS: 14 point review of systems completed, pertinent positives and negatives as per HPI.    Allergies: penicillins (Rash)  PMH -- No pertinent past medical history  PSH -- No significant past surgical history  FH -- No pertinent family history in first degree relatives  Social History -- denies tobacco, alcohol or illicit drug use    Physical Exam--  Vital Signs Last 24 Hrs  T(F): 98.1 (04 Aug 2023 07:54), Max: 100.4 (03 Aug 2023 22:37)  HR: 63 (04 Aug 2023 07:54) (63 - 87)  BP: 116/75 (04 Aug 2023 07:54) (102/64 - 158/90)  RR: 15 (04 Aug 2023 07:54) (15 - 18)  SpO2: 96% (04 Aug 2023 07:54) (96% - 98%)  General: no acute distress  HEENT: NC/AT, EOMI, anicteric, neck supple  Lungs: Clear bilaterally without rales, wheezing or rhonchi  Heart: S1, S2 present, RRR. No murmur, rub or gallop.  Abdomen: Soft. Nondistended. Nontender. BS present.   Neuro: AAOx3, no obvious focal deficits   Extremities: No cyanosis or clubbing. No edema.   Skin: Warm. Dry. R forearm erythema to above elbow (mostly within markings) with warmth and mild TTP, no open wounds/lesions; hand with small closed cuts on finger tips with no sign of infection  Psychiatric: Appropriate affect and mood for situation.   Lines: PIV    Laboratory & Imaging Data--  CBC:                       13.4   17.21 )-----------( 170      ( 04 Aug 2023 07:56 )             37.7     WBC Count: 17.21 K/uL (23 @ 07:56)  WBC Count: 19.90 K/uL (23 @ 23:14)    CMP:     135  |  100  |  18  ----------------------------<  123<H>  4.0   |  18<L>  |  1.08    Ca    9.5      03 Aug 2023 23:14    TPro  7.5  /  Alb  4.7  /  TBili  1.4<H>  /  DBili  x   /  AST  22  /  ALT  19  /  AlkPhos  46      LIVER FUNCTIONS - ( 03 Aug 2023 23:14 )  Alb: 4.7 g/dL / Pro: 7.5 g/dL / ALK PHOS: 46 U/L / ALT: 19 U/L / AST: 22 U/L / GGT: x           Urinalysis Basic - ( 04 Aug 2023 01:22 )  Color: Light Yellow / Appearance: Clear / S.031 / pH: x  Gluc: x / Ketone: Negative  / Bili: Negative / Urobili: Negative   Blood: x / Protein: Trace / Nitrite: Negative   Leuk Esterase: Negative / RBC: 1 /hpf / WBC 1 /HPF   Sq Epi: x / Non Sq Epi: x / Bacteria: Negative    Microbiology: reviewed  Respiratory Viral Panel with COVID-19 by JACOB (23 @ 23:19)    Rapid RVP Result: [facility]   SARS-CoV-2: [facility]: This Respiratory Panel uses polymerase chain reaction (PCR) to detect for  adenovirus; coronavirus (HKU1, NL63, 229E, OC43); human metapneumovirus  (hMPV); human enterovirus/rhinovirus (Entero/RV); influenza A; influenza  A/H1; influenza A/H3; influenza A/H1-2009; influenza B; parainfluenza  viruses 1, 2, 3, 4; respiratory syncytial virus; Mycoplasma pneumoniae;  Chlamydophila pneumoniae; and SARS-CoV-2.    Radiology--reviewed  < from: CT Head No Cont (23 @ 00:59) >  IMPRESSION:    CT HEAD: No acute intra-cranial hemorrhage, mass effect, or midline shift.    CTA BRAIN: Patent intracranial circulation.No proximal stenosis or   aneurysm.    CTA NECK: Patent cervical vasculature. No hemodynamically significant   stenosis by NASCET criteria or dissection.    NASCET CAROTID STENOSIS CRITERIA (distal normal appearing ICA as   denominator for measurement): 0%-none, 1-49%-mild, 50-70%-moderate,   70-89%-severe, 90-99%-critical.    < end of copied text >  < from: VA Duplex Upper Ext Vein Scan, Right (23 @ 23:52) >  IMPRESSION:  No evidence of right upper extremity deep venous thrombosis.    < end of copied text >  < from: Xray Elbow AP + Lateral + Oblique, Right (23 @ 23:19) >  IMPRESSION:  No osseous erosion to suggest acute osteomyelitis. No joint effusion.    < end of copied text >  < from: Xray Chest 1 View AP/PA (23 @ 23:19) >    IMPRESSION:  Clear lungs.    < end of copied text >    Active Medications--  aspirin enteric coated 81 milliGRAM(s) Oral daily  ceFAZolin   IVPB 1000 milliGRAM(s) IV Intermittent every 8 hours  melatonin 3 milliGRAM(s) Oral at bedtime PRN    Current Antimicrobials:   ceFAZolin   IVPB 1000 milliGRAM(s) IV Intermittent every 8 hours    Prior/Completed Antimicrobials:  ceFAZolin   IVPB

## 2023-08-04 NOTE — STROKE CODE NOTE - INITIAL PRESENTATION
6.21.18 @ 0804  to call to reschedule her 8.9.108 LT MOI    Lisbet will talk to her  and call back to confirm 8.16.18      RESCHEDULE    SURGERY DATE:   August 16, 2018    Location: [facility]    TIME: Patient aware that a call will come from [facility] 1-3 days prior to surgery    SURGICAL PROCEDURE:  LEFT Total Hip Arthroplasty    Dx: arthritis of left hip    Patient Admission Status: Surgery Admit (Planned Admit to Inpatient)    Reason: dr out      Estimated body mass index is 25.93 kg/m² as calculated from the following:    Height as of 6/18/18: 5' 7\" (1.702 m).    Weight as of 6/18/18: 75.1 kg.    Payor: TRA/SHANEL [employer]HE[employer] CAREGIVER / Plan: [id number] / Product Type: COMM   Specialty Referral Req'd?:  N/A  Secondary?   No  Work Comp?:  No      STAFF MESSAGE TO ESTIMATE DEPT:     PRE OP APPT  -     Lazaro Rivera MD    [place] - Internal Medicine  Phone: [phone]  Fax: [phone]    Patient, or staff to schedule- patient made    · LOCATION:  [facility]  · DATE : 7.23.18  · TIME: 1430  · SURGERY INFO SENT TO PCP: Yes   · LINKED ORDER : Yes  · FAX CONFIRMATION RECEIVED:         POST OP:  DATE: 8.31.18  TIME: 0915  LOCATION:[facility]      TJ DATE (if necessary): 6.28.18    TAMMY APT MADE: Yes    CASE CREATED/ADJUSTED: [facility] [employer] and confirmed with [phone] = Frannie .      STAFF MESSAGE PRE HAB OF CHANGES: Yes  8.9.18 rescheuled to 8.16.18 - [facility]   Received: Today   Message Contents   Rea Jeronimo  P Slm Pmr Hs Sched Msg Pool   Cc: Rea Jeronimo             Please assist in coordinating PREHAB and/or POSTOP REHAB for our Total Joint Replacement patient.     Surgery Date:   August 16, 2018   Surgical Procedure:  LEFT Total Hip Arthroplasty   Surgeon:  Dr. Rich Herndon   Hospital: [facility]     PREHAB needed?  Yes   POSTOP rehab needed?  ?     Thank you!   Rea sevilla [phone]        PRE AUTH STAFF  MESSAGED WITH CHANGES: Yes  8.9.18 rescheduled to 8.16.18 - [facility]   Received: Today   Message Contents   Rea Jeronimo  P Ortho Preauth-Spine/Ortho   Cc: Rea Jeronimo             Surgery/Procedure RESCHEDULED:     Original Date:  8.9.18     New Date:  8.16.18   OR Location: [facility]     Other changes to procedure?  No     Reason for change:  Dr out     Thanks!   Thank you,   Rea at [phone]   Surgery Scheduler for Dr. Rich Herndon   [facility]        J.I.M. ORDERED: Yes      ENC DOUBLE CHECKED: Yes    SURGERY INFO SENT TO PATIENT: Yes - LETTER ONLY   DATE SENT: June 21, 2018       Enclosures:       Staffed messaged Darvin/John  - Notifying of reschedule:     Pre instructions reviewed and understood by patient  Medication questions to be directed by PCP at pre op apt  NPO after midnight regardless of arrival time incase of cancellation than patient is prepped for earlier time       ED

## 2023-08-04 NOTE — H&P ADULT - ATTENDING COMMENTS
72M, no known PMHx (doesn't see doctors too often), presenting w/ 10-15minutes of word-finding difficulties, and confusion (not able to use his own phone).  Pt reported that he was trying to call his daughter, but he was unable to.   Neuro sx resolved fully.   Pt also has swelling on the right elbow area, started about 1 prior to presentation, associated chills at home. He was gardening the day before.   Pt initially came in as code stroke: neuro r/o'd acute stroke with CT head.   Neuro c/s appreciated.     DDx for neurologic sx TIA vs. metabolic encephalopathy 2/2 sepsis d/t cellulitis.    ROS: neg except for HPI above.   PEx significant for no focal neurologic deficit, AAOx4. RUE w/ erythema (area marked) and swelling. Heart exam with RRR, no murmur auscultated.     #Confusion  -DDx: TIA vs. metabolic encephalopathy  -fully resolved now.   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 F/u labs, echo, and telemetry monitoring.   -pt reports he was taking full dose asa w/o doctor's rx, for "prevention". Start asa 81mg.     #Sepsis d/t cellulitis  -significant leukocytosis, fever x1.   -Started on abx.   -f/u cultures  -monitor for improvement and transition to PO when able     #Hyperbilirubinemia  -very mild, no RUQ pain. Unclear significance at this. Monitor for now.     DVT ppx with lovenox  Diet: DASH

## 2023-08-04 NOTE — H&P ADULT - NSHPREVIEWOFSYSTEMS_GEN_ALL_CORE
CONSTITUTIONAL:  (+) Chills. No weight loss, fever, weakness or fatigue.  HEENT:  Eyes:  No visual loss, blurred vision, double vision or yellow sclerae. Ears, Nose, Throat:  No hearing loss, sneezing, congestion, runny nose or sore throat.  SKIN:  (+) RUE erythema. No itching.  CARDIOVASCULAR:  No chest pain, chest pressure or chest discomfort. No palpitations.  RESPIRATORY:  No shortness of breath, cough or sputum.  GASTROINTESTINAL:  No anorexia, nausea, vomiting or diarrhea. No abdominal pain or blood.  GENITOURINARY:  Denies hematuria, dysuria.   NEUROLOGICAL:  No headache, dizziness, syncope, paralysis, ataxia, numbness or tingling in the extremities. No change in bowel or bladder control.  MUSCULOSKELETAL:  No muscle, back pain, joint pain or stiffness.  HEMATOLOGIC:  No anemia, bleeding or bruising.  LYMPHATICS:  No enlarged nodes.   PSYCHIATRIC:  No history of depression or anxiety.  ENDOCRINOLOGIC:  No reports of sweating, cold or heat intolerance. No polyuria or polydipsia.  ALLERGIES:  No history of asthma, hives, eczema or rhinitis.

## 2023-08-04 NOTE — PATIENT PROFILE ADULT - FUNCTIONAL ASSESSMENT - BASIC MOBILITY 1.
Room # 4    Stress Test: 4/17/19    Episodes of Fatigue    Visit Vitals  /62 (BP 1 Location: Left arm, BP Patient Position: Sitting)   Pulse 64   Resp 16   Ht 5' 3\" (1.6 m)   Wt 189 lb 9.6 oz (86 kg)   SpO2 94%   BMI 33.59 kg/m² 4 = No assist / stand by assistance

## 2023-08-04 NOTE — H&P ADULT - PROBLEM SELECTOR PLAN 1
Area of erythema from elbow to distal forearm delineated by skin marker w/ associated edema. WBCs of ~20k.   -F/u blood cultures  -c/w ancef 2g q8h Area of erythema from elbow to distal forearm delineated by skin marker w/ associated edema. WBCs of ~20k.   -F/u blood cultures  -c/w ancef 1g q8h Area of erythema from elbow to distal forearm delineated by skin marker w/ associated edema.  -area of erythema was marked in the ED.   - fever 100.4 and  WBCs of ~20k.   -F/u blood cultures  -c/w ancef 1g q8h  -XR neg for effusion or osteo.

## 2023-08-04 NOTE — H&P ADULT - NSHPLABSRESULTS_GEN_ALL_CORE
14.9   19.90 )-----------( 213      ( 03 Aug 2023 23:14 )             41.9     08-03    135  |  100  |  18  ----------------------------<  123<H>  4.0   |  18<L>  |  1.08    Ca    9.5      03 Aug 2023 23:14    TPro  7.5  /  Alb  4.7  /  TBili  1.4<H>  /  DBili  x   /  AST  22  /  ALT  19  /  AlkPhos  46  08-03    CT Head No Cont (08.04.23 @ 00:59)    IMPRESSION:    CT HEAD: No acute intra-cranial hemorrhage, mass effect, or midline shift.    CTA BRAIN: Patent intracranial circulation.No proximal stenosis or   aneurysm.    CTA NECK: Patent cervical vasculature. No hemodynamically significant   stenosis by NASCET criteria or dissection.    NASCET CAROTID STENOSIS CRITERIA (distal normal appearing ICA as   denominator for measurement): 0%-none, 1-49%-mild, 50-70%-moderate,   70-89%-severe, 90-99%-critical.

## 2023-08-04 NOTE — PROGRESS NOTE ADULT - SUBJECTIVE AND OBJECTIVE BOX
INCOMPLETE NOTE    Interval Events:    REVIEW OF SYSTEMS:  CONSTITUTIONAL: No weakness, fevers or chills  EYES/ENT: No visual changes;  No vertigo or throat pain   NECK: No pain or stiffness  RESPIRATORY: No cough, wheezing, hemoptysis; No shortness of breath  CARDIOVASCULAR: No chest pain or palpitations  GASTROINTESTINAL: No abdominal or epigastric pain. No nausea, vomiting, or hematemesis; No diarrhea or constipation. No melena or hematochezia.  GENITOURINARY: No dysuria, frequency or hematuria  NEUROLOGICAL: No numbness or weakness  SKIN: No itching, burning, rashes, or lesions   All other review of systems is negative unless indicated above.    OBJECTIVE:  ICU Vital Signs Last 24 Hrs  T(C): 36.7 (04 Aug 2023 07:54), Max: 38 (03 Aug 2023 22:37)  T(F): 98.1 (04 Aug 2023 07:54), Max: 100.4 (03 Aug 2023 22:37)  HR: 63 (04 Aug 2023 07:54) (63 - 87)  BP: 116/75 (04 Aug 2023 07:54) (102/64 - 158/90)  BP(mean): --  ABP: --  ABP(mean): --  RR: 15 (04 Aug 2023 07:54) (15 - 18)  SpO2: 96% (04 Aug 2023 07:54) (96% - 98%)    O2 Parameters below as of 04 Aug 2023 07:54  Patient On (Oxygen Delivery Method): room air              CAPILLARY BLOOD GLUCOSE  129 (04 Aug 2023 02:14)      POCT Blood Glucose.: 129 mg/dL (03 Aug 2023 22:40)      PHYSICAL EXAM:  General: WN/WD NAD  Neurology: A&Ox3, nonfocal, GALICIA x 4  Eyes: PERRLA/ EOMI, Gross vision intact  ENT/Neck: Neck supple, trachea midline, No JVD, Gross hearing intact  Respiratory: CTA B/L, No wheezing, rales, rhonchi  CV: RRR, +S1/S2, -S3/S4, no murmurs, rubs or gallops  Abdominal: Soft, NT, ND +BS, No HSM  MSK: 5/5 strength UE/LE bilaterally  Extremities: No edema, 2+ peripheral pulses  Skin: No Rashes, Hematoma, Ecchymosis  Incisions:   Tubes:    HOSPITAL MEDICATIONS:  MEDICATIONS  (STANDING):  aspirin enteric coated 81 milliGRAM(s) Oral daily  ceFAZolin   IVPB 1000 milliGRAM(s) IV Intermittent every 8 hours    MEDICATIONS  (PRN):  acetaminophen     Tablet .. 650 milliGRAM(s) Oral every 6 hours PRN Temp greater or equal to 38C (100.4F), Mild Pain (1 - 3), Moderate Pain (4 - 6), Severe Pain (7 - 10)  melatonin 3 milliGRAM(s) Oral at bedtime PRN Insomnia      LABS:                        13.4   17.21 )-----------( 170      ( 04 Aug 2023 07:56 )             37.7     Hgb Trend: 13.4<--, 14.9<--  08-04    136  |  103  |  19  ----------------------------<  123<H>  3.8   |  20<L>  |  1.18    Ca    8.7      04 Aug 2023 07:55  Phos  2.6     08-04  Mg     1.8     08-04    TPro  6.2  /  Alb  3.9  /  TBili  1.6<H>  /  DBili  x   /  AST  13  /  ALT  14  /  AlkPhos  38<L>  08-04    Creatinine Trend: 1.18<--, 1.08<--  PT/INR - ( 03 Aug 2023 23:14 )   PT: 12.1 sec;   INR: 1.10 ratio         PTT - ( 03 Aug 2023 23:14 )  PTT:29.3 sec  Urinalysis Basic - ( 04 Aug 2023 07:55 )    Color: x / Appearance: x / SG: x / pH: x  Gluc: 123 mg/dL / Ketone: x  / Bili: x / Urobili: x   Blood: x / Protein: x / Nitrite: x   Leuk Esterase: x / RBC: x / WBC x   Sq Epi: x / Non Sq Epi: x / Bacteria: x        Venous Blood Gas:  08-03 @ 23:00  7.46/31/57/22/89.5  VBG Lactate: 1.5      MICROBIOLOGY:      Interval Events: No acute events overnight. Pt. states that he is no longer having word finding difficulties and is mentating at baseline. Fever has reduced after receiving acetaminophen in ED. Denies chest pain, SOB, pain of out of proportion in RUE, changes in sensation, changes in range of motion of RUE.     REVIEW OF SYSTEMS:  CONSTITUTIONAL: No weakness, fevers or chills  EYES/ENT: No visual changes;  No vertigo or throat pain   NECK: No pain or stiffness  RESPIRATORY: No cough, wheezing, hemoptysis; No shortness of breath  CARDIOVASCULAR: No chest pain or palpitations  GASTROINTESTINAL: No abdominal or epigastric pain. No nausea, vomiting, or hematemesis; No diarrhea or constipation. No melena or hematochezia.  GENITOURINARY: No dysuria, frequency or hematuria  NEUROLOGICAL: No numbness or weakness  SKIN: No itching, burning, rashes, or lesions   All other review of systems is negative unless indicated above.    OBJECTIVE:  ICU Vital Signs Last 24 Hrs  T(C): 36.7 (04 Aug 2023 07:54), Max: 38 (03 Aug 2023 22:37)  T(F): 98.1 (04 Aug 2023 07:54), Max: 100.4 (03 Aug 2023 22:37)  HR: 63 (04 Aug 2023 07:54) (63 - 87)  BP: 116/75 (04 Aug 2023 07:54) (102/64 - 158/90)  BP(mean): --  ABP: --  ABP(mean): --  RR: 15 (04 Aug 2023 07:54) (15 - 18)  SpO2: 96% (04 Aug 2023 07:54) (96% - 98%)    O2 Parameters below as of 04 Aug 2023 07:54  Patient On (Oxygen Delivery Method): room air      CAPILLARY BLOOD GLUCOSE  129 (04 Aug 2023 02:14)      POCT Blood Glucose.: 129 mg/dL (03 Aug 2023 22:40)      PHYSICAL EXAM:  General: WN/WD NAD  Neurology: A&Ox3, nonfocal, GALICIA x 4  Eyes: PERRLA/ EOMI, Gross vision intact  ENT/Neck: Neck supple, trachea midline, No JVD, Gross hearing intact  Respiratory: CTA B/L, No wheezing, rales, rhonchi  CV: RRR, +S1/S2, -S3/S4, no murmurs, rubs or gallops  Abdominal: Soft, NT, ND +BS, No HSM  MSK: 5/5 strength UE/LE bilaterally, full range of motion  Extremities: mild RUE edema, 2+ peripheral pulses  Skin: Erythematous, non-purulent rash poorly demarcated, extending from mid-forearm to above the elbow. Currently outlined with marker. No lymphadenopathy in extremity of axillary region. Skin openings in three fingers in RUE.     HOSPITAL MEDICATIONS:  MEDICATIONS  (STANDING):  aspirin enteric coated 81 milliGRAM(s) Oral daily  ceFAZolin   IVPB 1000 milliGRAM(s) IV Intermittent every 8 hours    MEDICATIONS  (PRN):  acetaminophen     Tablet .. 650 milliGRAM(s) Oral every 6 hours PRN Temp greater or equal to 38C (100.4F), Mild Pain (1 - 3), Moderate Pain (4 - 6), Severe Pain (7 - 10)  melatonin 3 milliGRAM(s) Oral at bedtime PRN Insomnia      LABS:                        13.4   17.21 )-----------( 170      ( 04 Aug 2023 07:56 )             37.7     Hgb Trend: 13.4<--, 14.9<--  08-04    136  |  103  |  19  ----------------------------<  123<H>  3.8   |  20<L>  |  1.18    Ca    8.7      04 Aug 2023 07:55  Phos  2.6     08-04  Mg     1.8     08-04    TPro  6.2  /  Alb  3.9  /  TBili  1.6<H>  /  DBili  x   /  AST  13  /  ALT  14  /  AlkPhos  38<L>  08-04    Creatinine Trend: 1.18<--, 1.08<--  PT/INR - ( 03 Aug 2023 23:14 )   PT: 12.1 sec;   INR: 1.10 ratio         PTT - ( 03 Aug 2023 23:14 )  PTT:29.3 sec  Urinalysis Basic - ( 04 Aug 2023 07:55 )    Color: x / Appearance: x / SG: x / pH: x  Gluc: 123 mg/dL / Ketone: x  / Bili: x / Urobili: x   Blood: x / Protein: x / Nitrite: x   Leuk Esterase: x / RBC: x / WBC x   Sq Epi: x / Non Sq Epi: x / Bacteria: x        Venous Blood Gas:  08-03 @ 23:00  7.46/31/57/22/89.5  VBG Lactate: 1.5      MICROBIOLOGY:

## 2023-08-04 NOTE — H&P ADULT - PROBLEM SELECTOR PLAN 2
Pt with difficulty using familiar device ~10 minutes. Code stroke called, CTH completed w/o acute changes.   -Neuro following, recs appreciated  >MRI brain w/o  >Check TSH, vitamin B1, B6, B12, folate, lactate, Vitamin D 25 OH, creatinine kinase, ammonia,   >C/w home aspirin Pt with difficulty using familiar device ~10 minutes. Code stroke called, CTH completed w/o acute changes.   -Neuro following, recs appreciated  >MRI brain w/o  >Check TSH, vitamin B1, B6, B12, folate, lactate, Vitamin D 25 OH, creatinine kinase, ammonia,   >C/w home aspirin  >TTE Pt with difficulty using familiar device ~10 minutes. Code stroke called, CTH completed w/o acute changes, but it did show chronic microangiopathic changes.   >DDx TIA vs. encephalopathy d/t sepsis.   -Neuro following, recs appreciated  >MRI brain w/o  >Check TSH, vitamin B1, B6, B12, folate, lactate, Vitamin D 25 OH, creatinine kinase, ammonia,   >pt was taking full dose ASA at home, without physician's direction, for "prevention"  >start asa 81mg daily  >TTE, check tele for arrhythmia.

## 2023-08-04 NOTE — CONSULT NOTE ADULT - ATTENDING COMMENTS
HPI as per resident note, personally verified by me. Patient with RUE pain and erythema and then 10 minute period of confusion (did not know how to use his phone at the time). Confirmed with patient and son (at bedside) that there was NO amnesia, aphasia, dysarthria, abnormal movements, or focal neurologic deficits now or at the time of the event and it self resolved. He only reports some RUE pain from cellulitis but otherwise no complaints and tolerating antibiotics well. Atrial fibrillation reported on telemetry.    Neurologic exam as per resident note with additions as below:  AAO x3, speech fluent  CN's II-XII intact  Strength 5/5 all except for RUE 4-4+/5 (pain limited)  Sens intact all  FtN intact b/l  Neutral b/l plantar response    ESR 23 (WNL for age/sex), CRP inc 28, NH3 23 WNL, UA (-), CPK WNL    < from: CT Head No Cont (08.04.23 @ 00:59) >    CT HEAD: No acute intra-cranial hemorrhage, mass effect, or midline shift.    CTA BRAIN: Patent intracranial circulation. No proximal stenosis or   aneurysm.    CTA NECK: Patent cervical vasculature. No hemodynamically significant   stenosis by NASCET criteria or dissection.    < end of copied text >      A/P:  Encephalopathy  RUE skin sensation disturbance and cellulitis  HTN  Atrial fibrillation    - Etiology for encephalopathy is likely metabolic in the setting of acute medical issue with RUE cellulitis. He did not have amnesia to the event so this would not be consistent with transient global amnesia. Also no focal neurologic deficits, no aphasia or dysarthria, and CT head unrevealing so very low suspicion for acute cerebrovascular event. May have had seizure but lower suspicion for this and if present it would have been provoked secondary to above  - Labs as per resident note  - No need for MRI brain at this time and can follow clinically  - If encephalopathic episode recurs can consider EEG  - No neurologic contraindications to discharge if patient is otherwise medically stable. Patient can follow up with general neurology at [address] [place] [address] 1-2 weeks after discharge. Please instruct the patient to call [phone] to schedule this appointment  - Continue to address above medical problems, as you are doing  - Will continue to follow patient peripherally with you, please call [phone]) with additional questions or concerns

## 2023-08-04 NOTE — H&P ADULT - ASSESSMENT
72y M no PMHx presenting for AMS initially c/f stroke as well as worsening painful RUE rash w/ leukocytosis to ~20k c/f cellulitis. Admitted to medicine for further management.

## 2023-08-04 NOTE — CHART NOTE - NSCHARTNOTEFT_GEN_A_CORE
SECOND TOUCH    Patient seen with medical student at bedside. Son is at bedside. Patient works as a dentist.    72M with no significant PMHx presenting with one day history of fever, expressive dysphagia, and right arm swelling, redness, and pain. 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 One day prior noticed swelling, redness, and warmth. Also felt cold and possible fever. He was trying to call his daughter one day prior, but couldn't. Per son at bedside had difficulty speaking (expressive). Patient presented here as stroke code, but cancelled. Patient denies prior history of skin infections. Unclear if there was a bug bite or otherwise break in skin.     Back to baseline mentation per son.  Given Ancef in the ED and seems to have tolerated    VS reviewed: 100.4 F  Baseline mentation, non-focal neuro exam  Right hand: demarcated areas of redness, warmth, seems to streak up from wrist to mid-humerus, cuts on three digits of right hand  No notable skin breaks aside from above  Tenderness to palpation (though not necessarily out of proportion)  No palpable collection   NO axillary lymphadenopathy appreciated    Labs personally reviewed   WBC: 19 --> 17  UA neg    CT head no acute pathology  CTA H&N no LVO  Right arm duplex NO DVT    EKG personally reviewed by me: NSR 85    Neurology consult appreciated    Plan:    - Possible expressive aphasia due to infectious etiology and now back at baseline -- plan for MRI brain w/o contrast, monitor on telemetry for occult afib (may need ILR as OP), baby aspirin for primary prevention  - Antibiotics for right arm, demarcated, may need to broaden given above risk factors  - Consideration for CT arm if not improved  - Lower concern for fungal infection such as sporotrichosis  - Pain control with Tylenol  - Follow up blood cultures    Rest of plan as documented in H&P and progress note, unless contradictory to my plan

## 2023-08-04 NOTE — H&P ADULT - NSHPSOCIALHISTORY_GEN_ALL_CORE
Home: Domiciled   ADL: Full ADL   Next of Kin: Stephanie Hernandes (daughter, [phone])  Alcohol: Denies  Smoking: Denies   Drugs: Denies

## 2023-08-04 NOTE — ED ADULT NURSE REASSESSMENT NOTE - NS ED NURSE REASSESS COMMENT FT1
Handoff report received from RN Calin. Pt A&O x 4, VSS. Pt denies any pain or needs at this time. Pt pending telemetry bed and echo. Pt and family updated on plan of care. Morning labs sent. Stretcher locked in lowest position, side rails up and call bell in reach.

## 2023-08-04 NOTE — H&P ADULT - HISTORY OF PRESENT ILLNESS
72y M no past medical history presenting after having difficulty using phone while attempting to contact physician daughter, episode lasting ~10 minutes. No dysarthria or weakness at time of episode. Additionally, pt with 1 day history of RUE rash. Rash initially started mid-forearm, painful but non-pruritic in nature, and has progressed proximally towards the elbow. He also notes worsening warmth and edema with the rash. Pt is R handed w/ distant history of tennis-playing w/ R hand (transitioned to L hand following shoulder injury). He endorses chills over the past day and gardens every other day. Pt attributed rash initially to bug bite but is unsure. He denies trauma, fall, sore throat, dysphagia, odynophagia, cough, dyspnea, abdominal pain, nausea, vomiting, urinary symptoms, alternative joint involvement, myalgia.     In ED, VS: 100.4, HR 86, /78, RR 16, O2 96%. Administered ancef, NS bolus x1, tylenol. Code stroke called on arrival to ED, NIHSS of 0 at time. CTH completed w/ microangiopathic changes but no indication of acute ischemic/hemorrhagic stroke.

## 2023-08-05 LAB
ALBUMIN SERPL ELPH-MCNC: 2.8 G/DL — LOW (ref 3.3–5)
ALP SERPL-CCNC: 31 U/L — LOW (ref 40–120)
ALT FLD-CCNC: 9 U/L — LOW (ref 10–45)
ANION GAP SERPL CALC-SCNC: 15 MMOL/L — SIGNIFICANT CHANGE UP (ref 5–17)
ANION GAP SERPL CALC-SCNC: 39 MMOL/L — HIGH (ref 5–17)
AST SERPL-CCNC: 9 U/L — LOW (ref 10–40)
BASOPHILS # BLD AUTO: 0 K/UL — SIGNIFICANT CHANGE UP (ref 0–0.2)
BASOPHILS NFR BLD AUTO: 0 % — SIGNIFICANT CHANGE UP (ref 0–2)
BILIRUB SERPL-MCNC: 0.4 MG/DL — SIGNIFICANT CHANGE UP (ref 0.2–1.2)
BUN SERPL-MCNC: 14 MG/DL — SIGNIFICANT CHANGE UP (ref 7–23)
BUN SERPL-MCNC: 20 MG/DL — SIGNIFICANT CHANGE UP (ref 7–23)
CALCIUM SERPL-MCNC: 6.7 MG/DL — LOW (ref 8.4–10.5)
CALCIUM SERPL-MCNC: 9.4 MG/DL — SIGNIFICANT CHANGE UP (ref 8.4–10.5)
CHLORIDE SERPL-SCNC: 102 MMOL/L — SIGNIFICANT CHANGE UP (ref 96–108)
CHLORIDE SERPL-SCNC: 71 MMOL/L — LOW (ref 96–108)
CO2 SERPL-SCNC: 15 MMOL/L — LOW (ref 22–31)
CO2 SERPL-SCNC: 20 MMOL/L — LOW (ref 22–31)
CREAT SERPL-MCNC: 0.89 MG/DL — SIGNIFICANT CHANGE UP (ref 0.5–1.3)
CREAT SERPL-MCNC: 1.2 MG/DL — SIGNIFICANT CHANGE UP (ref 0.5–1.3)
EGFR: 64 ML/MIN/1.73M2 — SIGNIFICANT CHANGE UP
EGFR: 90 ML/MIN/1.73M2 — SIGNIFICANT CHANGE UP
EOSINOPHIL # BLD AUTO: 0 K/UL — SIGNIFICANT CHANGE UP (ref 0–0.5)
EOSINOPHIL NFR BLD AUTO: 0 % — SIGNIFICANT CHANGE UP (ref 0–6)
GLUCOSE SERPL-MCNC: 185 MG/DL — HIGH (ref 70–99)
GLUCOSE SERPL-MCNC: 699 MG/DL — CRITICAL HIGH (ref 70–99)
HCT VFR BLD CALC: 31 % — LOW (ref 39–50)
HCT VFR BLD CALC: 41.2 % — SIGNIFICANT CHANGE UP (ref 39–50)
HGB BLD-MCNC: 10.6 G/DL — LOW (ref 13–17)
HGB BLD-MCNC: 14.6 G/DL — SIGNIFICANT CHANGE UP (ref 13–17)
HIV 1+2 AB+HIV1 P24 AG SERPL QL IA: SIGNIFICANT CHANGE UP
LYMPHOCYTES # BLD AUTO: 0.75 K/UL — LOW (ref 1–3.3)
LYMPHOCYTES # BLD AUTO: 6.1 % — LOW (ref 13–44)
MAGNESIUM SERPL-MCNC: 1.5 MG/DL — LOW (ref 1.6–2.6)
MAGNESIUM SERPL-MCNC: 2.1 MG/DL — SIGNIFICANT CHANGE UP (ref 1.6–2.6)
MANUAL SMEAR VERIFICATION: SIGNIFICANT CHANGE UP
MCHC RBC-ENTMCNC: 30.7 PG — SIGNIFICANT CHANGE UP (ref 27–34)
MCHC RBC-ENTMCNC: 30.9 PG — SIGNIFICANT CHANGE UP (ref 27–34)
MCHC RBC-ENTMCNC: 34.2 GM/DL — SIGNIFICANT CHANGE UP (ref 32–36)
MCHC RBC-ENTMCNC: 35.4 GM/DL — SIGNIFICANT CHANGE UP (ref 32–36)
MCV RBC AUTO: 87.1 FL — SIGNIFICANT CHANGE UP (ref 80–100)
MCV RBC AUTO: 89.9 FL — SIGNIFICANT CHANGE UP (ref 80–100)
MONOCYTES # BLD AUTO: 0.86 K/UL — SIGNIFICANT CHANGE UP (ref 0–0.9)
MONOCYTES NFR BLD AUTO: 7 % — SIGNIFICANT CHANGE UP (ref 2–14)
MRSA PCR RESULT.: SIGNIFICANT CHANGE UP
NEUTROPHILS # BLD AUTO: 10.71 K/UL — HIGH (ref 1.8–7.4)
NEUTROPHILS NFR BLD AUTO: 85.2 % — HIGH (ref 43–77)
NEUTS BAND # BLD: 1.7 % — SIGNIFICANT CHANGE UP (ref 0–8)
NRBC # BLD: 0 /100 WBCS — SIGNIFICANT CHANGE UP (ref 0–0)
PHOSPHATE SERPL-MCNC: 1.8 MG/DL — LOW (ref 2.5–4.5)
PHOSPHATE SERPL-MCNC: 1.8 MG/DL — LOW (ref 2.5–4.5)
PLAT MORPH BLD: NORMAL — SIGNIFICANT CHANGE UP
PLATELET # BLD AUTO: 111 K/UL — LOW (ref 150–400)
PLATELET # BLD AUTO: 159 K/UL — SIGNIFICANT CHANGE UP (ref 150–400)
POTASSIUM SERPL-MCNC: 2.8 MMOL/L — CRITICAL LOW (ref 3.5–5.3)
POTASSIUM SERPL-MCNC: 4 MMOL/L — SIGNIFICANT CHANGE UP (ref 3.5–5.3)
POTASSIUM SERPL-SCNC: 2.8 MMOL/L — CRITICAL LOW (ref 3.5–5.3)
POTASSIUM SERPL-SCNC: 4 MMOL/L — SIGNIFICANT CHANGE UP (ref 3.5–5.3)
PROT SERPL-MCNC: 5.1 G/DL — LOW (ref 6–8.3)
RBC # BLD: 3.45 M/UL — LOW (ref 4.2–5.8)
RBC # BLD: 4.73 M/UL — SIGNIFICANT CHANGE UP (ref 4.2–5.8)
RBC # FLD: 12.7 % — SIGNIFICANT CHANGE UP (ref 10.3–14.5)
RBC # FLD: 12.9 % — SIGNIFICANT CHANGE UP (ref 10.3–14.5)
RBC BLD AUTO: SIGNIFICANT CHANGE UP
S AUREUS DNA NOSE QL NAA+PROBE: DETECTED
SODIUM SERPL-SCNC: 125 MMOL/L — LOW (ref 135–145)
SODIUM SERPL-SCNC: 137 MMOL/L — SIGNIFICANT CHANGE UP (ref 135–145)
WBC # BLD: 12.32 K/UL — HIGH (ref 3.8–10.5)
WBC # BLD: 16.32 K/UL — HIGH (ref 3.8–10.5)
WBC # FLD AUTO: 12.32 K/UL — HIGH (ref 3.8–10.5)
WBC # FLD AUTO: 16.32 K/UL — HIGH (ref 3.8–10.5)

## 2023-08-05 PROCEDURE — 99232 SBSQ HOSP IP/OBS MODERATE 35: CPT | Mod: GC

## 2023-08-05 RX ORDER — POLYETHYLENE GLYCOL 3350 17 G/17G
17 POWDER, FOR SOLUTION ORAL DAILY
Refills: 0 | Status: DISCONTINUED | OUTPATIENT
Start: 2023-08-05 | End: 2023-08-07

## 2023-08-05 RX ORDER — SODIUM,POTASSIUM PHOSPHATES 278-250MG
1 POWDER IN PACKET (EA) ORAL ONCE
Refills: 0 | Status: DISCONTINUED | OUTPATIENT
Start: 2023-08-05 | End: 2023-08-05

## 2023-08-05 RX ORDER — CHLORHEXIDINE GLUCONATE 213 G/1000ML
1 SOLUTION TOPICAL DAILY
Refills: 0 | Status: DISCONTINUED | OUTPATIENT
Start: 2023-08-05 | End: 2023-08-07

## 2023-08-05 RX ORDER — SENNA PLUS 8.6 MG/1
1 TABLET ORAL DAILY
Refills: 0 | Status: DISCONTINUED | OUTPATIENT
Start: 2023-08-05 | End: 2023-08-07

## 2023-08-05 RX ORDER — PANTOPRAZOLE SODIUM 20 MG/1
40 TABLET, DELAYED RELEASE ORAL
Refills: 0 | Status: DISCONTINUED | OUTPATIENT
Start: 2023-08-05 | End: 2023-08-07

## 2023-08-05 RX ORDER — POTASSIUM PHOSPHATE, MONOBASIC POTASSIUM PHOSPHATE, DIBASIC 236; 224 MG/ML; MG/ML
30 INJECTION, SOLUTION INTRAVENOUS ONCE
Refills: 0 | Status: COMPLETED | OUTPATIENT
Start: 2023-08-05 | End: 2023-08-05

## 2023-08-05 RX ORDER — SODIUM,POTASSIUM PHOSPHATES 278-250MG
2 POWDER IN PACKET (EA) ORAL ONCE
Refills: 0 | Status: COMPLETED | OUTPATIENT
Start: 2023-08-05 | End: 2023-08-05

## 2023-08-05 RX ADMIN — Medication 100 MILLIGRAM(S): at 13:27

## 2023-08-05 RX ADMIN — Medication 800 MILLIGRAM(S): at 07:31

## 2023-08-05 RX ADMIN — Medication 325 MILLIGRAM(S): at 05:30

## 2023-08-05 RX ADMIN — Medication 650 MILLIGRAM(S): at 07:31

## 2023-08-05 RX ADMIN — Medication 2 PACKET(S): at 14:51

## 2023-08-05 RX ADMIN — CHLORHEXIDINE GLUCONATE 1 APPLICATION(S): 213 SOLUTION TOPICAL at 13:32

## 2023-08-05 RX ADMIN — PANTOPRAZOLE SODIUM 40 MILLIGRAM(S): 20 TABLET, DELAYED RELEASE ORAL at 08:13

## 2023-08-05 RX ADMIN — Medication 100 MILLIGRAM(S): at 22:18

## 2023-08-05 RX ADMIN — PREGABALIN 1000 MICROGRAM(S): 225 CAPSULE ORAL at 12:19

## 2023-08-05 RX ADMIN — Medication 650 MILLIGRAM(S): at 06:31

## 2023-08-05 RX ADMIN — Medication 100 MILLIGRAM(S): at 05:29

## 2023-08-05 RX ADMIN — Medication 325 MILLIGRAM(S): at 17:25

## 2023-08-05 RX ADMIN — Medication 800 MILLIGRAM(S): at 06:31

## 2023-08-05 RX ADMIN — POTASSIUM PHOSPHATE, MONOBASIC POTASSIUM PHOSPHATE, DIBASIC 83.33 MILLIMOLE(S): 236; 224 INJECTION, SOLUTION INTRAVENOUS at 13:27

## 2023-08-05 NOTE — DISCHARGE NOTE PROVIDER - NSDCCPCAREPLAN_GEN_ALL_CORE_FT
PRINCIPAL DISCHARGE DIAGNOSIS  Diagnosis: Cellulitis  Assessment and Plan of Treatment: Cellulitis  Cellulitis is a skin infection caused by bacteria. This condition occurs most often in the arms and lower legs but can occur anywhere over the body. Symptoms include redness, swelling, warm skin, tenderness, and chills/fever. If you were prescribed an antibiotic medicine, take it as told by your health care provider. Do not stop taking the antibiotic even if you start to feel better.  SEEK IMMEDIATE MEDICAL CARE IF YOU HAVE ANY OF THE FOLLOWING SYMPTOMS: worsening fever, red streaks coming from affected area, vomiting or diarrhea, or dizziness/lightheadedness.      SECONDARY DISCHARGE DIAGNOSES  Diagnosis: Transient ischemic attack (TIA)  Assessment and Plan of Treatment: A transient ischemic attack (TIA) causes stroke-like symptoms that go away quickly. Having a TIA means that a person is at higher risk for a stroke. A TIA happens when blood supply to the brain is blocked temporarily. We are not perfectly convinced that this has happened to you, nevertheless, continue with daily aspirin as you have been. (You can also use just 81mg once daily). We defferred MRI given the symptoms were self limited and the CT head and angiogram of the head and neck were negative.

## 2023-08-05 NOTE — PROGRESS NOTE ADULT - SUBJECTIVE AND OBJECTIVE BOX
[facility] DIVISION OF INFECTIOUS DISEASES  REDD Escamilla Y. Patel, S. Shah, [facility]. [facility]  [phone]  ([phone] - weekdays after 5pm and weekends)    Name: FERNANDO PARDO  Age/Gender: 72y Male  MRN: [id number]    Interval History:  Patient seen and examined this morning.   No new complaints noted.  Notes reviewed. Afebrile   Daughter at bedside  Allergies: penicillins (Rash)      Objective:  Vitals:   T(F): 97.8 (08-05-23 @ 04:30), Max: 98.7 (08-04-23 @ 20:47)  HR: 51 (08-05-23 @ 04:30) (50 - 70)  BP: 112/68 (08-05-23 @ 04:30) (112/68 - 137/77)  RR: 18 (08-05-23 @ 04:30) (16 - 19)  SpO2: 97% (08-05-23 @ 04:30) (96% - 99%)  Physical Examination:  General: no acute distress  HEENT: NC/AT, anicteric, neck supple  Respiratory: no acc muscle use, breathing comfortably  Cardiovascular: S1 and S2 present  Gastrointestinal: normal appearing, nondistended  Extremities: RUE edema, no cyanosis  Skin: RUE erythema improved, mild TTP and warmth    Laboratory Studies:  CBC:                       14.6   16.32 )-----------( 159      ( 05 Aug 2023 09:02 )             41.2     WBC Trend:  16.32 08-05-23 @ 09:02  12.32 08-05-23 @ 06:29  17.21 08-04-23 @ 07:56  19.90 08-03-23 @ 23:14    CMP: 08-05    137  |  102  |  20  ----------------------------<  185<H>  4.0   |  20<L>  |  1.20    Ca    9.4      05 Aug 2023 09:02  Phos  1.8     08-05  Mg     2.1     08-05    TPro  5.1<L>  /  Alb  2.8<L>  /  TBili  0.4  /  DBili  x   /  AST  9<L>  /  ALT  9<L>  /  AlkPhos  31<L>  08-05    Creatinine: 1.20 mg/dL (08-05-23 @ 09:02)  Creatinine: 0.89 mg/dL (08-05-23 @ 06:28)  Creatinine: 1.18 mg/dL (08-04-23 @ 07:55)  Creatinine: 1.08 mg/dL (08-03-23 @ 23:14)      LIVER FUNCTIONS - ( 05 Aug 2023 06:28 )  Alb: 2.8 g/dL / Pro: 5.1 g/dL / ALK PHOS: 31 U/L / ALT: 9 U/L / AST: 9 U/L / GGT: x           Microbiology: reviewed   Culture - Urine (collected 08-04-23 @ 01:22)  Source: Clean Catch Clean Catch (Midstream)  Final Report (08-04-23 @ 23:24):    <10,000 CFU/mL Normal Urogenital Ping    Culture - Blood (collected 08-04-23 @ 00:06)  Source: .Blood Blood-Peripheral  Preliminary Report (08-05-23 @ 04:02):    No growth at 24 hours    Culture - Blood (collected 08-03-23 @ 23:52)  Source: .Blood Blood-Peripheral  Preliminary Report (08-05-23 @ 04:02):    No growth at 24 hours    Radiology: reviewed     Medications:  acetaminophen     Tablet .. 650 milliGRAM(s) Oral every 6 hours PRN  aspirin enteric coated 325 milliGRAM(s) Oral two times a day  ceFAZolin   IVPB 2000 milliGRAM(s) IV Intermittent every 8 hours  chlorhexidine 2% Cloths 1 Application(s) Topical daily  cyanocobalamin 1000 MICROGram(s) Oral daily  melatonin 3 milliGRAM(s) Oral at bedtime PRN  pantoprazole    Tablet 40 milliGRAM(s) Oral before breakfast  polyethylene glycol 3350 17 Gram(s) Oral daily  potassium phosphate IVPB 30 milliMole(s) IV Intermittent once  senna 1 Tablet(s) Oral daily    Current Antimicrobials:  ceFAZolin   IVPB 2000 milliGRAM(s) IV Intermittent every 8 hours    Prior/Completed Antimicrobials:  ceFAZolin   IVPB

## 2023-08-05 NOTE — DISCHARGE NOTE PROVIDER - NSDCCPTREATMENT_GEN_ALL_CORE_FT
PRINCIPAL PROCEDURE  Procedure: CT, head  Findings and Treatment:   IMPRESSION:  CT HEAD: No acute intra-cranial hemorrhage, mass effect, or midline shift.  CTA BRAIN: Patent intracranial circulation.No proximal stenosis or   aneurysm.  CTA NECK: Patent cervical vasculature. No hemodynamically significant   stenosis by NASCET criteria or dissection.  NASCET CAROTID STENOSIS CRITERIA (distal normal appearing ICA as   denominator for measurement): 0%-none, 1-49%-mild, 50-70%-moderate,   70-89%-severe, 90-99%-critical.< from: CT Head No Cont (08.04.23 @ 00:59) >

## 2023-08-05 NOTE — DISCHARGE NOTE PROVIDER - NSDCFUADDAPPT_GEN_ALL_CORE_FT
Please follow up with your PCP in 2-4 weeks  Please follow up with infectious disease specialist in 1-2 weeks Please follow up with your PCP in 2-4 weeks  Please call infectious disease office to get an appointment time for 8/10 Please follow up with your PCP in 2-4 weeks   Please call infectious disease office to get an appointment time for 8/10

## 2023-08-05 NOTE — PROGRESS NOTE ADULT - ATTENDING COMMENTS
72y M no PMHx presenting for AMS initially c/f stroke as well as worsening painful RUE rash w/ leukocytosis to ~20k, a/w sepsis 2/2 cellulitis.   Pt feeling better overall, still with some tenderness in RUE, area of erythema appears to be receding   Labs showing slowly downtrending WBC (WBC ~12 drawn from line with ancef), labs otherwise with hypoPhos    - c/w ancef for now   - given improvement in erythema, will hold off on CT RUE  - if labs and exam c/t improve, anticipate d/c in 1-2 days on PO keflex for likely 7d course; f/u with ID

## 2023-08-05 NOTE — DISCHARGE NOTE PROVIDER - PROVIDER TOKENS
PROVIDER:[TOKEN:[[id number]],FOLLOWUP:[1 week],ESTABLISHEDPATIENT:[T]] PROVIDER:[TOKEN:[[id number]],SCHEDULEDAPPT:[08/10/2023],ESTABLISHEDPATIENT:[T]]

## 2023-08-05 NOTE — DISCHARGE NOTE PROVIDER - HOSPITAL COURSE
72M with no significant PMHx presenting with one day history of fever, expressive dysphagia, and right arm swelling, redness, and pain. 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 One day prior noticed swelling, redness, and warmth. Also felt cold and possible fever. He was trying to call his daughter one day prior, but couldn't. Per son at bedside had difficulty speaking (expressive). Patient presented here as stroke code, but cancelled. Patient denies prior history of skin infections. Unclear if there was a bug bite or otherwise break in.    Patient returned back to baseline with normal CT Head and CT angio head and neck. MRI was deferred per neurology.   ID was consulted for management of cellulitis and he was started on Cefazolin with plans to transition to PO ------ on discharge.    Patient was medically optimized and hemodynamically stable for discharge to   Home  with plans for follow up to PCP 72M with no significant PMHx presenting with one day history of fever, expressive dysphagia, and right arm swelling, redness, and pain. 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 One day prior noticed swelling, redness, and warmth. Also felt cold and possible fever. He was trying to call his daughter one day prior, but couldn't. Per son at bedside had difficulty speaking (expressive). Patient presented here as stroke code, but cancelled. Patient denies prior history of skin infections. Unclear if there was a bug bite or otherwise break in.    Patient returned back to baseline with normal CT Head and CT angio head and neck. MRI was deferred per neurology.   ID was consulted for management of cellulitis and he was started on Cefazolin with plans to transition to PO ------ on discharge.    Patient was medically optimized and hemodynamically stable for discharge to   Home  with plans for follow up to PCP & ID 72M with no significant PMHx presenting with one day history of fever, expressive dysphagia, and right arm swelling, redness, and pain. 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 One day prior noticed swelling, redness, and warmth. Also felt cold and possible fever. He was trying to call his daughter one day prior, but couldn't. Per son at bedside had difficulty speaking (expressive). Patient presented here as stroke code, but cancelled. Patient denies prior history of skin infections. Unclear if there was a bug bite or otherwise break in.    Patient returned back to baseline with normal CT Head and CT angio head and neck. MRI was deferred per neurology.   ID was consulted for management of cellulitis and he was started on Cefazolin with plans to transition to PO Cephalexin 500mg PO Q6h to complete 10d course on discharge. Will follow with ID outpatient.   Blood and Urine Cultures resulted negative, MRSA swab+. Pt. WBC downtrending since admission (17 -> 10), cellulitis appears less erythematous and contained within marked region. Pt. has remained afebrile since admission.     Patient was medically optimized and hemodynamically stable for discharge to Home with plans for follow up to PCP & ID

## 2023-08-05 NOTE — PROGRESS NOTE ADULT - SUBJECTIVE AND OBJECTIVE BOX
Maxim Shawnando | PGY1| Pager: [phone]  Interval Events: No acute events overnight. Pt seen and examined at bedside.  Patient denies any complaints overnight. The patient denies any fevers, chills, nausea, vomiting, or increased pain.       REVIEW OF SYSTEMS:  CONSTITUTIONAL: No weakness, fevers or chills  EYES/ENT: No visual changes;  No vertigo or throat pain   NECK: No pain or stiffness  RESPIRATORY: No cough, wheezing, hemoptysis; No shortness of breath  CARDIOVASCULAR: No chest pain or palpitations  GASTROINTESTINAL: No abdominal or epigastric pain. No nausea, vomiting, or hematemesis; No diarrhea or constipation. No melena or hematochezia.  GENITOURINARY: No dysuria, frequency or hematuria  NEUROLOGICAL: No numbness or weakness  SKIN: No itching, burning, rashes, or lesions   All other review of systems is negative unless indicated above.    OBJECTIVE:  ICU Vital Signs Last 24 Hrs  T(C): 36.6 (05 Aug 2023 04:30), Max: 37.1 (04 Aug 2023 20:47)  T(F): 97.8 (05 Aug 2023 04:30), Max: 98.7 (04 Aug 2023 20:47)  HR: 51 (05 Aug 2023 04:30) (50 - 70)  BP: 112/68 (05 Aug 2023 04:30) (112/68 - 137/77)  BP(mean): --  ABP: --  ABP(mean): --  RR: 18 (05 Aug 2023 04:30) (16 - 19)  SpO2: 97% (05 Aug 2023 04:30) (96% - 99%)    O2 Parameters below as of 05 Aug 2023 04:30  Patient On (Oxygen Delivery Method): room air              08-04 @ 07:01  -  08-05 @ 07:00  --------------------------------------------------------  IN: 340 mL / OUT: 500 mL / NET: -160 mL      CAPILLARY BLOOD GLUCOSE  129 (04 Aug 2023 02:14)      POCT Blood Glucose.: 129 mg/dL (03 Aug 2023 22:40)      PHYSICAL EXAM:  General: WN/WD NAD  Neurology: A&Ox3, nonfocal, GALICIA x 4  Eyes: PERRLA/ EOMI, Gross vision intact  ENT/Neck: Neck supple, trachea midline, No JVD, Gross hearing intact  Respiratory: CTA B/L, No wheezing, rales, rhonchi  CV: RRR, +S1/S2, -S3/S4, no murmurs, rubs or gallops  Abdominal: Soft, NT, ND +BS, No HSM  MSK: 5/5 strength UE/LE bilaterally  Extremities: No edema, 2+ peripheral pulses  Skin: No Rashes, Hematoma, Ecchymosis  Incisions:   Tubes:    HOSPITAL MEDICATIONS:  MEDICATIONS  (STANDING):  aspirin enteric coated 325 milliGRAM(s) Oral two times a day  ceFAZolin   IVPB 2000 milliGRAM(s) IV Intermittent every 8 hours  chlorhexidine 2% Cloths 1 Application(s) Topical daily  cyanocobalamin 1000 MICROGram(s) Oral daily  pantoprazole    Tablet 40 milliGRAM(s) Oral before breakfast  potassium phosphate IVPB 30 milliMole(s) IV Intermittent once    MEDICATIONS  (PRN):  acetaminophen     Tablet .. 650 milliGRAM(s) Oral every 6 hours PRN Temp greater or equal to 38C (100.4F), Mild Pain (1 - 3), Moderate Pain (4 - 6), Severe Pain (7 - 10)  melatonin 3 milliGRAM(s) Oral at bedtime PRN Insomnia      LABS:                        14.6   16.32 )-----------( 159      ( 05 Aug 2023 09:02 )             41.2     Hgb Trend: 14.6<--, 10.6<--, 13.4<--, 14.9<--  08-05    137  |  102  |  20  ----------------------------<  185<H>  4.0   |  20<L>  |  1.20    Ca    9.4      05 Aug 2023 09:02  Phos  1.8     08-05  Mg     2.1     08-05    TPro  5.1<L>  /  Alb  2.8<L>  /  TBili  0.4  /  DBili  x   /  AST  9<L>  /  ALT  9<L>  /  AlkPhos  31<L>  08-05    Creatinine Trend: 1.20<--, 0.89<--, 1.18<--, 1.08<--  PT/INR - ( 03 Aug 2023 23:14 )   PT: 12.1 sec;   INR: 1.10 ratio         PTT - ( 03 Aug 2023 23:14 )  PTT:29.3 sec  Urinalysis Basic - ( 05 Aug 2023 09:02 )    Color: x / Appearance: x / SG: x / pH: x  Gluc: 185 mg/dL / Ketone: x  / Bili: x / Urobili: x   Blood: x / Protein: x / Nitrite: x   Leuk Esterase: x / RBC: x / WBC x   Sq Epi: x / Non Sq Epi: x / Bacteria: x        Venous Blood Gas:  08-03 @ 23:00  7.46/31/57/22/89.5  VBG Lactate: 1.5      MICROBIOLOGY:     Culture - Urine (collected 04 Aug 2023 01:22)  Source: Clean Catch Clean Catch (Midstream)  Final Report (04 Aug 2023 23:24):    <10,000 CFU/mL Normal Urogenital Ping    Culture - Blood (collected 04 Aug 2023 00:06)  Source: .Blood Blood-Peripheral  Preliminary Report (05 Aug 2023 04:02):    No growth at 24 hours    Culture - Blood (collected 03 Aug 2023 23:52)  Source: .Blood Blood-Peripheral  Preliminary Report (05 Aug 2023 04:02):    No growth at 24 hours

## 2023-08-05 NOTE — DISCHARGE NOTE PROVIDER - NSDCMRMEDTOKEN_GEN_ALL_CORE_FT
aspirin 325 mg oral capsule: 1 tab(s) orally 2 times a day   Adult Aspirin Regimen 81 mg oral delayed release tablet: 1 tab(s) orally once a day  cephalexin 500 mg oral tablet: 1 tab(s) orally every 6 hours Please take this medication every day for 7 days  cholecalciferol oral tablet: 2000 unit(s) orally once a day  cyanocobalamin 1000 mcg oral tablet: 1 tab(s) orally once a day

## 2023-08-05 NOTE — DISCHARGE NOTE PROVIDER - CARE PROVIDER_API CALL
Oriana Smith.  Infectious Disease  [address] [facility], [facility] [address]  [place] [address]  Phone: [phone]  Fax: [phone]  Established Patient  Follow Up Time: 1 week   Oriana Smith.  Infectious Disease  [address] [place] [address], [facility] [address]  [place] [address]  Phone: [phone]  Fax: [phone]  Established Patient  Scheduled Appointment: 08/10/2023

## 2023-08-06 LAB
ALBUMIN SERPL ELPH-MCNC: 4.1 G/DL — SIGNIFICANT CHANGE UP (ref 3.3–5)
ALP SERPL-CCNC: 63 U/L — SIGNIFICANT CHANGE UP (ref 40–120)
ALT FLD-CCNC: 11 U/L — SIGNIFICANT CHANGE UP (ref 10–45)
ANION GAP SERPL CALC-SCNC: 19 MMOL/L — HIGH (ref 5–17)
APPEARANCE UR: CLEAR — SIGNIFICANT CHANGE UP
AST SERPL-CCNC: 14 U/L — SIGNIFICANT CHANGE UP (ref 10–40)
BACTERIA # UR AUTO: NEGATIVE — SIGNIFICANT CHANGE UP
BASOPHILS # BLD AUTO: 0.05 K/UL — SIGNIFICANT CHANGE UP (ref 0–0.2)
BASOPHILS NFR BLD AUTO: 0.3 % — SIGNIFICANT CHANGE UP (ref 0–2)
BILIRUB SERPL-MCNC: 1 MG/DL — SIGNIFICANT CHANGE UP (ref 0.2–1.2)
BILIRUB UR-MCNC: NEGATIVE — SIGNIFICANT CHANGE UP
BUN SERPL-MCNC: 13 MG/DL — SIGNIFICANT CHANGE UP (ref 7–23)
CALCIUM SERPL-MCNC: 9.5 MG/DL — SIGNIFICANT CHANGE UP (ref 8.4–10.5)
CHLORIDE SERPL-SCNC: 98 MMOL/L — SIGNIFICANT CHANGE UP (ref 96–108)
CO2 SERPL-SCNC: 18 MMOL/L — LOW (ref 22–31)
COLOR SPEC: YELLOW — SIGNIFICANT CHANGE UP
CREAT SERPL-MCNC: 1.16 MG/DL — SIGNIFICANT CHANGE UP (ref 0.5–1.3)
DIFF PNL FLD: ABNORMAL
EGFR: 67 ML/MIN/1.73M2 — SIGNIFICANT CHANGE UP
EOSINOPHIL # BLD AUTO: 0.04 K/UL — SIGNIFICANT CHANGE UP (ref 0–0.5)
EOSINOPHIL NFR BLD AUTO: 0.2 % — SIGNIFICANT CHANGE UP (ref 0–6)
EPI CELLS # UR: 0 /HPF — SIGNIFICANT CHANGE UP
GLUCOSE SERPL-MCNC: 73 MG/DL — SIGNIFICANT CHANGE UP (ref 70–99)
GLUCOSE UR QL: NEGATIVE — SIGNIFICANT CHANGE UP
HCT VFR BLD CALC: 39.8 % — SIGNIFICANT CHANGE UP (ref 39–50)
HGB BLD-MCNC: 14.1 G/DL — SIGNIFICANT CHANGE UP (ref 13–17)
HYALINE CASTS # UR AUTO: 2 /LPF — SIGNIFICANT CHANGE UP (ref 0–2)
IMM GRANULOCYTES NFR BLD AUTO: 0.9 % — SIGNIFICANT CHANGE UP (ref 0–0.9)
KETONES UR-MCNC: NEGATIVE — SIGNIFICANT CHANGE UP
LEUKOCYTE ESTERASE UR-ACNC: NEGATIVE — SIGNIFICANT CHANGE UP
LYMPHOCYTES # BLD AUTO: 1.2 K/UL — SIGNIFICANT CHANGE UP (ref 1–3.3)
LYMPHOCYTES # BLD AUTO: 6.8 % — LOW (ref 13–44)
MAGNESIUM SERPL-MCNC: 2 MG/DL — SIGNIFICANT CHANGE UP (ref 1.6–2.6)
MCHC RBC-ENTMCNC: 30.9 PG — SIGNIFICANT CHANGE UP (ref 27–34)
MCHC RBC-ENTMCNC: 35.4 GM/DL — SIGNIFICANT CHANGE UP (ref 32–36)
MCV RBC AUTO: 87.3 FL — SIGNIFICANT CHANGE UP (ref 80–100)
MONOCYTES # BLD AUTO: 1.39 K/UL — HIGH (ref 0–0.9)
MONOCYTES NFR BLD AUTO: 7.8 % — SIGNIFICANT CHANGE UP (ref 2–14)
MRSA PCR RESULT.: SIGNIFICANT CHANGE UP
NEUTROPHILS # BLD AUTO: 14.88 K/UL — HIGH (ref 1.8–7.4)
NEUTROPHILS NFR BLD AUTO: 84 % — HIGH (ref 43–77)
NITRITE UR-MCNC: NEGATIVE — SIGNIFICANT CHANGE UP
NRBC # BLD: 0 /100 WBCS — SIGNIFICANT CHANGE UP (ref 0–0)
PH UR: 5.5 — SIGNIFICANT CHANGE UP (ref 5–8)
PHOSPHATE SERPL-MCNC: 3.1 MG/DL — SIGNIFICANT CHANGE UP (ref 2.5–4.5)
PLATELET # BLD AUTO: 176 K/UL — SIGNIFICANT CHANGE UP (ref 150–400)
POTASSIUM SERPL-MCNC: 3.7 MMOL/L — SIGNIFICANT CHANGE UP (ref 3.5–5.3)
POTASSIUM SERPL-SCNC: 3.7 MMOL/L — SIGNIFICANT CHANGE UP (ref 3.5–5.3)
PROT SERPL-MCNC: 7.5 G/DL — SIGNIFICANT CHANGE UP (ref 6–8.3)
PROT UR-MCNC: ABNORMAL
RBC # BLD: 4.56 M/UL — SIGNIFICANT CHANGE UP (ref 4.2–5.8)
RBC # FLD: 12.6 % — SIGNIFICANT CHANGE UP (ref 10.3–14.5)
RBC CASTS # UR COMP ASSIST: 2 /HPF — SIGNIFICANT CHANGE UP (ref 0–4)
S AUREUS DNA NOSE QL NAA+PROBE: DETECTED
SODIUM SERPL-SCNC: 135 MMOL/L — SIGNIFICANT CHANGE UP (ref 135–145)
SP GR SPEC: 1.02 — SIGNIFICANT CHANGE UP (ref 1.01–1.02)
UROBILINOGEN FLD QL: NEGATIVE — SIGNIFICANT CHANGE UP
WBC # BLD: 17.72 K/UL — HIGH (ref 3.8–10.5)
WBC # FLD AUTO: 17.72 K/UL — HIGH (ref 3.8–10.5)
WBC UR QL: 1 /HPF — SIGNIFICANT CHANGE UP (ref 0–5)

## 2023-08-06 PROCEDURE — 99232 SBSQ HOSP IP/OBS MODERATE 35: CPT | Mod: GC

## 2023-08-06 RX ORDER — IBUPROFEN 200 MG
200 TABLET ORAL ONCE
Refills: 0 | Status: DISCONTINUED | OUTPATIENT
Start: 2023-08-06 | End: 2023-08-06

## 2023-08-06 RX ADMIN — PANTOPRAZOLE SODIUM 40 MILLIGRAM(S): 20 TABLET, DELAYED RELEASE ORAL at 05:42

## 2023-08-06 RX ADMIN — Medication 100 MILLIGRAM(S): at 05:42

## 2023-08-06 RX ADMIN — Medication 100 MILLIGRAM(S): at 13:07

## 2023-08-06 RX ADMIN — CHLORHEXIDINE GLUCONATE 1 APPLICATION(S): 213 SOLUTION TOPICAL at 13:08

## 2023-08-06 RX ADMIN — PREGABALIN 1000 MICROGRAM(S): 225 CAPSULE ORAL at 13:08

## 2023-08-06 RX ADMIN — Medication 100 MILLIGRAM(S): at 21:22

## 2023-08-06 RX ADMIN — Medication 650 MILLIGRAM(S): at 15:15

## 2023-08-06 RX ADMIN — Medication 325 MILLIGRAM(S): at 05:42

## 2023-08-06 RX ADMIN — Medication 325 MILLIGRAM(S): at 17:56

## 2023-08-06 NOTE — PROGRESS NOTE ADULT - SUBJECTIVE AND OBJECTIVE BOX
PROGRESS NOTE:     Patient is a 72y old  Male who presents with a chief complaint of cellulitis, possible TIA (05 Aug 2023 12:14)      SUBJECTIVE / OVERNIGHT EVENTS:    ADDITIONAL REVIEW OF SYSTEMS:    MEDICATIONS  (STANDING):  aspirin enteric coated 325 milliGRAM(s) Oral two times a day  ceFAZolin   IVPB 2000 milliGRAM(s) IV Intermittent every 8 hours  chlorhexidine 2% Cloths 1 Application(s) Topical daily  cyanocobalamin 1000 MICROGram(s) Oral daily  pantoprazole    Tablet 40 milliGRAM(s) Oral before breakfast  polyethylene glycol 3350 17 Gram(s) Oral daily  senna 1 Tablet(s) Oral daily    MEDICATIONS  (PRN):  acetaminophen     Tablet .. 650 milliGRAM(s) Oral every 6 hours PRN Temp greater or equal to 38C (100.4F), Mild Pain (1 - 3), Moderate Pain (4 - 6), Severe Pain (7 - 10)  melatonin 3 milliGRAM(s) Oral at bedtime PRN Insomnia      CAPILLARY BLOOD GLUCOSE        I&O's Summary    05 Aug 2023 07:01  -  06 Aug 2023 07:00  --------------------------------------------------------  IN: 700 mL / OUT: 0 mL / NET: 700 mL        PHYSICAL EXAM:  Vital Signs Last 24 Hrs  T(C): 36.9 (06 Aug 2023 04:05), Max: 37.3 (05 Aug 2023 20:00)  T(F): 98.4 (06 Aug 2023 04:05), Max: 99.1 (05 Aug 2023 20:00)  HR: 69 (06 Aug 2023 08:06) (59 - 87)  BP: 123/77 (06 Aug 2023 08:06) (123/77 - 168/77)  BP(mean): --  RR: 17 (06 Aug 2023 08:06) (17 - 18)  SpO2: 96% (06 Aug 2023 08:06) (95% - 99%)    Parameters below as of 06 Aug 2023 08:06  Patient On (Oxygen Delivery Method): room air        CONSTITUTIONAL: NAD, well-developed  RESPIRATORY: Normal respiratory effort; lungs are clear to auscultation bilaterally  CARDIOVASCULAR: Regular rate and rhythm, normal S1 and S2, no murmur/rub/gallop; No lower extremity edema; Peripheral pulses are 2+ bilaterally  ABDOMEN: Nontender to palpation, normoactive bowel sounds, no rebound/guarding  MUSCULOSKELETAL: no clubbing or cyanosis of digits; no joint swelling or tenderness to palpation  PSYCH: A+O to person, place, and time; affect appropriate    LABS:                        14.1   17.72 )-----------( 176      ( 06 Aug 2023 07:08 )             39.8     08-06    135  |  98  |  13  ----------------------------<  73  3.7   |  18<L>  |  1.16    Ca    9.5      06 Aug 2023 07:08  Phos  3.1     08-06  Mg     2.0     08-06    TPro  7.5  /  Alb  4.1  /  TBili  1.0  /  DBili  x   /  AST  14  /  ALT  11  /  AlkPhos  63  08-06          Urinalysis Basic - ( 06 Aug 2023 07:08 )    Color: x / Appearance: x / SG: x / pH: x  Gluc: 73 mg/dL / Ketone: x  / Bili: x / Urobili: x   Blood: x / Protein: x / Nitrite: x   Leuk Esterase: x / RBC: x / WBC x   Sq Epi: x / Non Sq Epi: x / Bacteria: x        Culture - Urine (collected 04 Aug 2023 01:22)  Source: Clean Catch Clean Catch (Midstream)  Final Report (04 Aug 2023 23:24):    <10,000 CFU/mL Normal Urogenital Ping    Culture - Blood (collected 04 Aug 2023 00:06)  Source: .Blood Blood-Peripheral  Preliminary Report (06 Aug 2023 04:01):    No growth at 48 Hours    Culture - Blood (collected 03 Aug 2023 23:52)  Source: .Blood Blood-Peripheral  Preliminary Report (06 Aug 2023 04:01):    No growth at 48 Hours        RADIOLOGY & ADDITIONAL TESTS:  Results Reviewed:   Imaging Personally Reviewed:  Electrocardiogram Personally Reviewed:    COORDINATION OF CARE:  Care Discussed with Consultants/Other Providers [Y/N]:  Prior or Outpatient Records Reviewed [Y/N]:   PROGRESS NOTE:     Patient is a 72y old  Male who presents with a chief complaint of cellulitis, possible TIA (05 Aug 2023 12:14)      SUBJECTIVE / OVERNIGHT EVENTS: No acute events overnight. This AM patient seen and examined at bedside. Patient tired after breakfast but feels well overall. Feels that cellulitis is improving.     ADDITIONAL REVIEW OF SYSTEMS:    MEDICATIONS  (STANDING):  aspirin enteric coated 325 milliGRAM(s) Oral two times a day  ceFAZolin   IVPB 2000 milliGRAM(s) IV Intermittent every 8 hours  chlorhexidine 2% Cloths 1 Application(s) Topical daily  cyanocobalamin 1000 MICROGram(s) Oral daily  pantoprazole    Tablet 40 milliGRAM(s) Oral before breakfast  polyethylene glycol 3350 17 Gram(s) Oral daily  senna 1 Tablet(s) Oral daily    MEDICATIONS  (PRN):  acetaminophen     Tablet .. 650 milliGRAM(s) Oral every 6 hours PRN Temp greater or equal to 38C (100.4F), Mild Pain (1 - 3), Moderate Pain (4 - 6), Severe Pain (7 - 10)  melatonin 3 milliGRAM(s) Oral at bedtime PRN Insomnia      CAPILLARY BLOOD GLUCOSE        I&O's Summary    05 Aug 2023 07:01  -  06 Aug 2023 07:00  --------------------------------------------------------  IN: 700 mL / OUT: 0 mL / NET: 700 mL        PHYSICAL EXAM:  Vital Signs Last 24 Hrs  T(C): 36.9 (06 Aug 2023 04:05), Max: 37.3 (05 Aug 2023 20:00)  T(F): 98.4 (06 Aug 2023 04:05), Max: 99.1 (05 Aug 2023 20:00)  HR: 69 (06 Aug 2023 08:06) (59 - 87)  BP: 123/77 (06 Aug 2023 08:06) (123/77 - 168/77)  BP(mean): --  RR: 17 (06 Aug 2023 08:06) (17 - 18)  SpO2: 96% (06 Aug 2023 08:06) (95% - 99%)    Parameters below as of 06 Aug 2023 08:06  Patient On (Oxygen Delivery Method): room air        CONSTITUTIONAL: NAD, well-developed  RESPIRATORY: Normal respiratory effort; lungs are clear to auscultation bilaterally  CARDIOVASCULAR: Regular rate and rhythm, normal S1 and S2, no murmur/rub/gallop; No lower extremity edema; Peripheral pulses are 2+ bilaterally  ABDOMEN: Nontender to palpation, normoactive bowel sounds, no rebound/guarding  MUSCULOSKELETAL: no clubbing or cyanosis of digits; no joint swelling or tenderness to palpation  PSYCH: A+O to person, place, and time; affect appropriate    LABS:                        14.1   17.72 )-----------( 176      ( 06 Aug 2023 07:08 )             39.8     08-06    135  |  98  |  13  ----------------------------<  73  3.7   |  18<L>  |  1.16    Ca    9.5      06 Aug 2023 07:08  Phos  3.1     08-06  Mg     2.0     08-06    TPro  7.5  /  Alb  4.1  /  TBili  1.0  /  DBili  x   /  AST  14  /  ALT  11  /  AlkPhos  63  08-06          Urinalysis Basic - ( 06 Aug 2023 07:08 )    Color: x / Appearance: x / SG: x / pH: x  Gluc: 73 mg/dL / Ketone: x  / Bili: x / Urobili: x   Blood: x / Protein: x / Nitrite: x   Leuk Esterase: x / RBC: x / WBC x   Sq Epi: x / Non Sq Epi: x / Bacteria: x        Culture - Urine (collected 04 Aug 2023 01:22)  Source: Clean Catch Clean Catch (Midstream)  Final Report (04 Aug 2023 23:24):    <10,000 CFU/mL Normal Urogenital Ping    Culture - Blood (collected 04 Aug 2023 00:06)  Source: .Blood Blood-Peripheral  Preliminary Report (06 Aug 2023 04:01):    No growth at 48 Hours    Culture - Blood (collected 03 Aug 2023 23:52)  Source: .Blood Blood-Peripheral  Preliminary Report (06 Aug 2023 04:01):    No growth at 48 Hours        RADIOLOGY & ADDITIONAL TESTS:  Results Reviewed:   Imaging Personally Reviewed:  Electrocardiogram Personally Reviewed:    COORDINATION OF CARE:  Care Discussed with Consultants/Other Providers [Y/N]:  Prior or Outpatient Records Reviewed [Y/N]:   PROGRESS NOTE:     Patient is a 72y old  Male who presents with a chief complaint of cellulitis, possible TIA (05 Aug 2023 12:14)      SUBJECTIVE / OVERNIGHT EVENTS: No acute events overnight. This AM patient seen and examined at bedside. Patient tired after breakfast but feels well overall. Feels that cellulitis is improving.     ADDITIONAL REVIEW OF SYSTEMS: Negative     MEDICATIONS  (STANDING):  aspirin enteric coated 325 milliGRAM(s) Oral two times a day  ceFAZolin   IVPB 2000 milliGRAM(s) IV Intermittent every 8 hours  chlorhexidine 2% Cloths 1 Application(s) Topical daily  cyanocobalamin 1000 MICROGram(s) Oral daily  pantoprazole    Tablet 40 milliGRAM(s) Oral before breakfast  polyethylene glycol 3350 17 Gram(s) Oral daily  senna 1 Tablet(s) Oral daily    MEDICATIONS  (PRN):  acetaminophen     Tablet .. 650 milliGRAM(s) Oral every 6 hours PRN Temp greater or equal to 38C (100.4F), Mild Pain (1 - 3), Moderate Pain (4 - 6), Severe Pain (7 - 10)  melatonin 3 milliGRAM(s) Oral at bedtime PRN Insomnia      CAPILLARY BLOOD GLUCOSE        I&O's Summary    05 Aug 2023 07:01  -  06 Aug 2023 07:00  --------------------------------------------------------  IN: 700 mL / OUT: 0 mL / NET: 700 mL        PHYSICAL EXAM:  Vital Signs Last 24 Hrs  T(C): 36.9 (06 Aug 2023 04:05), Max: 37.3 (05 Aug 2023 20:00)  T(F): 98.4 (06 Aug 2023 04:05), Max: 99.1 (05 Aug 2023 20:00)  HR: 69 (06 Aug 2023 08:06) (59 - 87)  BP: 123/77 (06 Aug 2023 08:06) (123/77 - 168/77)  BP(mean): --  RR: 17 (06 Aug 2023 08:06) (17 - 18)  SpO2: 96% (06 Aug 2023 08:06) (95% - 99%)    Parameters below as of 06 Aug 2023 08:06  Patient On (Oxygen Delivery Method): room air    Physical Examination:  General: no acute distress  HEENT: NC/AT, anicteric, neck supple  Respiratory: no acc muscle use, breathing comfortably  Cardiovascular: S1 and S2 present  Gastrointestinal: normal appearing, nondistended  Extremities: dec RUE edema, still with swelling at R elbow  Skin: RUE erythema appears further improved, still with erythema over elbow with TTP and warmth    LABS:                        14.1   17.72 )-----------( 176      ( 06 Aug 2023 07:08 )             39.8     08-06    135  |  98  |  13  ----------------------------<  73  3.7   |  18<L>  |  1.16    Ca    9.5      06 Aug 2023 07:08  Phos  3.1     08-06  Mg     2.0     08-06    TPro  7.5  /  Alb  4.1  /  TBili  1.0  /  DBili  x   /  AST  14  /  ALT  11  /  AlkPhos  63  08-06          Urinalysis Basic - ( 06 Aug 2023 07:08 )    Color: x / Appearance: x / SG: x / pH: x  Gluc: 73 mg/dL / Ketone: x  / Bili: x / Urobili: x   Blood: x / Protein: x / Nitrite: x   Leuk Esterase: x / RBC: x / WBC x   Sq Epi: x / Non Sq Epi: x / Bacteria: x        Culture - Urine (collected 04 Aug 2023 01:22)  Source: Clean Catch Clean Catch (Midstream)  Final Report (04 Aug 2023 23:24):    <10,000 CFU/mL Normal Urogenital Ping    Culture - Blood (collected 04 Aug 2023 00:06)  Source: .Blood Blood-Peripheral  Preliminary Report (06 Aug 2023 04:01):    No growth at 48 Hours    Culture - Blood (collected 03 Aug 2023 23:52)  Source: .Blood Blood-Peripheral  Preliminary Report (06 Aug 2023 04:01):    No growth at 48 Hours        RADIOLOGY & ADDITIONAL TESTS:  Results Reviewed:   Imaging Personally Reviewed:  Electrocardiogram Personally Reviewed:    COORDINATION OF CARE:  Care Discussed with Consultants/Other Providers [Y/N]:  Prior or Outpatient Records Reviewed [Y/N]:

## 2023-08-06 NOTE — PROVIDER CONTACT NOTE (OTHER) - ACTION/TREATMENT ORDERED:
Provider made aware, will recheck HR in 1 hr, monitoring remains ongoing.
No interventions at this time, will continue to monitor and assess.

## 2023-08-06 NOTE — PROGRESS NOTE ADULT - SUBJECTIVE AND OBJECTIVE BOX
[facility] DIVISION OF INFECTIOUS DISEASES  REDD Escamilla Y. Patel, S. Shah, G. Jeffrey  [phone]  ([phone] - weekdays after 5pm and weekends)    Name: FERNANDO PARDO  Age/Gender: 72y Male  MRN: [id number]    Interval History:  Patient seen and examined this morning.   Feels swelling, pain and erythema are better,  Used ACE dressing overnight and elevating arm  Still with pain and swelling at elbow area  Notes reviewed  No concerning overnight events  Afebrile   Allergies: penicillins (Rash)      Objective:  Vitals:   T(F): 98.4 (08-06-23 @ 04:05), Max: 99.1 (08-05-23 @ 20:00)  HR: 69 (08-06-23 @ 08:06) (59 - 87)  BP: 123/77 (08-06-23 @ 08:06) (123/77 - 168/77)  RR: 17 (08-06-23 @ 08:06) (17 - 18)  SpO2: 96% (08-06-23 @ 08:06) (95% - 99%)  Physical Examination:  General: no acute distress  HEENT: NC/AT, anicteric, neck supple  Respiratory: no acc muscle use, breathing comfortably  Cardiovascular: S1 and S2 present  Gastrointestinal: normal appearing, nondistended  Extremities: dec RUE edema, still with swelling at R elbow  Skin: RUE erythema appears further improved, still with erythema over elbow with TTP and warmth    Laboratory Studies:  CBC:                       14.1   17.72 )-----------( 176      ( 06 Aug 2023 07:08 )             39.8     WBC Trend:  17.72 08-06-23 @ 07:08  16.32 08-05-23 @ 09:02  12.32 08-05-23 @ 06:29  17.21 08-04-23 @ 07:56  19.90 08-03-23 @ 23:14    CMP: 08-06    135  |  98  |  13  ----------------------------<  73  3.7   |  18<L>  |  1.16    Ca    9.5      06 Aug 2023 07:08  Phos  3.1     08-06  Mg     2.0     08-06    TPro  7.5  /  Alb  4.1  /  TBili  1.0  /  DBili  x   /  AST  14  /  ALT  11  /  AlkPhos  63  08-06    Creatinine: 1.16 mg/dL (08-06-23 @ 07:08)  Creatinine: 1.20 mg/dL (08-05-23 @ 09:02)  Creatinine: 0.89 mg/dL (08-05-23 @ 06:28)  Creatinine: 1.18 mg/dL (08-04-23 @ 07:55)  Creatinine: 1.08 mg/dL (08-03-23 @ 23:14)    LIVER FUNCTIONS - ( 06 Aug 2023 07:08 )  Alb: 4.1 g/dL / Pro: 7.5 g/dL / ALK PHOS: 63 U/L / ALT: 11 U/L / AST: 14 U/L / GGT: x           Microbiology: reviewed   Culture - Urine (collected 08-04-23 @ 01:22)  Source: Clean Catch Clean Catch (Midstream)  Final Report (08-04-23 @ 23:24):    <10,000 CFU/mL Normal Urogenital Ping    Culture - Blood (collected 08-04-23 @ 00:06)  Source: .Blood Blood-Peripheral  Preliminary Report (08-06-23 @ 04:01):    No growth at 48 Hours    Culture - Blood (collected 08-03-23 @ 23:52)  Source: .Blood Blood-Peripheral  Preliminary Report (08-06-23 @ 04:01):    No growth at 48 Hours    Radiology: reviewed     Medications:  acetaminophen     Tablet .. 650 milliGRAM(s) Oral every 6 hours PRN  aspirin enteric coated 325 milliGRAM(s) Oral two times a day  ceFAZolin   IVPB 2000 milliGRAM(s) IV Intermittent every 8 hours  chlorhexidine 2% Cloths 1 Application(s) Topical daily  cyanocobalamin 1000 MICROGram(s) Oral daily  melatonin 3 milliGRAM(s) Oral at bedtime PRN  pantoprazole    Tablet 40 milliGRAM(s) Oral before breakfast  polyethylene glycol 3350 17 Gram(s) Oral daily  senna 1 Tablet(s) Oral daily    Current Antimicrobials:  ceFAZolin   IVPB 2000 milliGRAM(s) IV Intermittent every 8 hours    Prior/Completed Antimicrobials:  ceFAZolin   IVPB

## 2023-08-06 NOTE — PROVIDER CONTACT NOTE (OTHER) - ASSESSMENT
bp 123/77, HR 69, O@ sat 96% and asymptomatic
Pt sleeping at time of tele event. Pt remains asymptomatic, afebrile, bp:123/72 HR 50. Pt denies palps, sob, cp, lightheadedness.

## 2023-08-06 NOTE — PROGRESS NOTE ADULT - ATTENDING COMMENTS
72y M no PMHx presenting for AMS initially c/f stroke as well as worsening painful RUE rash w/ leukocytosis to ~20k, a/w sepsis 2/2 cellulitis.   Pt feeling better overall, still with some tenderness in RUE, area of erythema appears to be receding   Labs showing slowly downtrending WBC (WBC ~12 drawn from line with ancef), labs otherwise with hypoPhos    - c/w ancef for now   - given improvement in erythema, will hold off on CT RUE  - if labs and exam c/t improve, anticipate d/c in 1-2 days on PO keflex for likely 7d course; f/u with ID 72y M no PMHx presenting for AMS initially c/f stroke as well as worsening painful RUE rash w/ leukocytosis to ~20k, a/w sepsis 2/2 cellulitis.   Pt feeling better overall, still with some tenderness in RUE, area of erythema appears to be receding   Labs showing slowly downtrending WBC (WBC ~12 drawn from line with ancef), labs otherwise with hypoPhos    - c/w ancef for now   - given improvement in erythema, will hold off on CT RUE  - if labs and exam c/t improve, anticipate d/c in 1-2 days on PO keflex for likely 7d course; f/u with ID  d/w resident in detail

## 2023-08-06 NOTE — PROVIDER CONTACT NOTE (OTHER) - BACKGROUND
71 y/o  male A&Ox4 came in for Cellulitis with a pmhx of sepsis due to cellulitis, TIA and hyperbilirunenia

## 2023-08-07 VITALS
DIASTOLIC BLOOD PRESSURE: 82 MMHG | HEART RATE: 69 BPM | RESPIRATION RATE: 18 BRPM | OXYGEN SATURATION: 97 % | TEMPERATURE: 98 F | SYSTOLIC BLOOD PRESSURE: 135 MMHG

## 2023-08-07 LAB
24R-OH-CALCIDIOL SERPL-MCNC: 19.4 NG/ML — LOW (ref 30–80)
ALBUMIN SERPL ELPH-MCNC: 3.5 G/DL — SIGNIFICANT CHANGE UP (ref 3.3–5)
ALP SERPL-CCNC: 46 U/L — SIGNIFICANT CHANGE UP (ref 40–120)
ALT FLD-CCNC: 9 U/L — LOW (ref 10–45)
ANION GAP SERPL CALC-SCNC: 18 MMOL/L — HIGH (ref 5–17)
AST SERPL-CCNC: 12 U/L — SIGNIFICANT CHANGE UP (ref 10–40)
BASOPHILS # BLD AUTO: 0.04 K/UL — SIGNIFICANT CHANGE UP (ref 0–0.2)
BASOPHILS NFR BLD AUTO: 0.4 % — SIGNIFICANT CHANGE UP (ref 0–2)
BILIRUB SERPL-MCNC: 0.4 MG/DL — SIGNIFICANT CHANGE UP (ref 0.2–1.2)
BUN SERPL-MCNC: 18 MG/DL — SIGNIFICANT CHANGE UP (ref 7–23)
CALCIUM SERPL-MCNC: 9.1 MG/DL — SIGNIFICANT CHANGE UP (ref 8.4–10.5)
CHLORIDE SERPL-SCNC: 100 MMOL/L — SIGNIFICANT CHANGE UP (ref 96–108)
CO2 SERPL-SCNC: 21 MMOL/L — LOW (ref 22–31)
CREAT SERPL-MCNC: 1.24 MG/DL — SIGNIFICANT CHANGE UP (ref 0.5–1.3)
EGFR: 61 ML/MIN/1.73M2 — SIGNIFICANT CHANGE UP
EOSINOPHIL # BLD AUTO: 0.14 K/UL — SIGNIFICANT CHANGE UP (ref 0–0.5)
EOSINOPHIL NFR BLD AUTO: 1.3 % — SIGNIFICANT CHANGE UP (ref 0–6)
GLUCOSE SERPL-MCNC: 144 MG/DL — HIGH (ref 70–99)
HCT VFR BLD CALC: 35.5 % — LOW (ref 39–50)
HGB BLD-MCNC: 12.6 G/DL — LOW (ref 13–17)
IMM GRANULOCYTES NFR BLD AUTO: 0.7 % — SIGNIFICANT CHANGE UP (ref 0–0.9)
LYMPHOCYTES # BLD AUTO: 1.12 K/UL — SIGNIFICANT CHANGE UP (ref 1–3.3)
LYMPHOCYTES # BLD AUTO: 10.8 % — LOW (ref 13–44)
MAGNESIUM SERPL-MCNC: 2.1 MG/DL — SIGNIFICANT CHANGE UP (ref 1.6–2.6)
MCHC RBC-ENTMCNC: 31.1 PG — SIGNIFICANT CHANGE UP (ref 27–34)
MCHC RBC-ENTMCNC: 35.5 GM/DL — SIGNIFICANT CHANGE UP (ref 32–36)
MCV RBC AUTO: 87.7 FL — SIGNIFICANT CHANGE UP (ref 80–100)
MONOCYTES # BLD AUTO: 0.95 K/UL — HIGH (ref 0–0.9)
MONOCYTES NFR BLD AUTO: 9.1 % — SIGNIFICANT CHANGE UP (ref 2–14)
NEUTROPHILS # BLD AUTO: 8.08 K/UL — HIGH (ref 1.8–7.4)
NEUTROPHILS NFR BLD AUTO: 77.7 % — HIGH (ref 43–77)
NRBC # BLD: 0 /100 WBCS — SIGNIFICANT CHANGE UP (ref 0–0)
PHOSPHATE SERPL-MCNC: 3.3 MG/DL — SIGNIFICANT CHANGE UP (ref 2.5–4.5)
PLATELET # BLD AUTO: 198 K/UL — SIGNIFICANT CHANGE UP (ref 150–400)
POTASSIUM SERPL-MCNC: 3.6 MMOL/L — SIGNIFICANT CHANGE UP (ref 3.5–5.3)
POTASSIUM SERPL-SCNC: 3.6 MMOL/L — SIGNIFICANT CHANGE UP (ref 3.5–5.3)
PROT SERPL-MCNC: 6.8 G/DL — SIGNIFICANT CHANGE UP (ref 6–8.3)
RBC # BLD: 4.05 M/UL — LOW (ref 4.2–5.8)
RBC # FLD: 12.7 % — SIGNIFICANT CHANGE UP (ref 10.3–14.5)
SODIUM SERPL-SCNC: 139 MMOL/L — SIGNIFICANT CHANGE UP (ref 135–145)
WBC # BLD: 10.4 K/UL — SIGNIFICANT CHANGE UP (ref 3.8–10.5)
WBC # FLD AUTO: 10.4 K/UL — SIGNIFICANT CHANGE UP (ref 3.8–10.5)

## 2023-08-07 PROCEDURE — 85018 HEMOGLOBIN: CPT

## 2023-08-07 PROCEDURE — 82652 VIT D 1 25-DIHYDROXY: CPT

## 2023-08-07 PROCEDURE — 85730 THROMBOPLASTIN TIME PARTIAL: CPT

## 2023-08-07 PROCEDURE — 82607 VITAMIN B-12: CPT

## 2023-08-07 PROCEDURE — 99239 HOSP IP/OBS DSCHRG MGMT >30: CPT

## 2023-08-07 PROCEDURE — 85610 PROTHROMBIN TIME: CPT

## 2023-08-07 PROCEDURE — 0225U NFCT DS DNA&RNA 21 SARSCOV2: CPT

## 2023-08-07 PROCEDURE — 82550 ASSAY OF CK (CPK): CPT

## 2023-08-07 PROCEDURE — 70496 CT ANGIOGRAPHY HEAD: CPT | Mod: MA

## 2023-08-07 PROCEDURE — 83735 ASSAY OF MAGNESIUM: CPT

## 2023-08-07 PROCEDURE — 84132 ASSAY OF SERUM POTASSIUM: CPT

## 2023-08-07 PROCEDURE — 80061 LIPID PANEL: CPT

## 2023-08-07 PROCEDURE — 85652 RBC SED RATE AUTOMATED: CPT

## 2023-08-07 PROCEDURE — 82803 BLOOD GASES ANY COMBINATION: CPT

## 2023-08-07 PROCEDURE — 70450 CT HEAD/BRAIN W/O DYE: CPT | Mod: MA

## 2023-08-07 PROCEDURE — 85025 COMPLETE CBC W/AUTO DIFF WBC: CPT

## 2023-08-07 PROCEDURE — 87389 HIV-1 AG W/HIV-1&-2 AB AG IA: CPT

## 2023-08-07 PROCEDURE — 82330 ASSAY OF CALCIUM: CPT

## 2023-08-07 PROCEDURE — 85014 HEMATOCRIT: CPT

## 2023-08-07 PROCEDURE — 36415 COLL VENOUS BLD VENIPUNCTURE: CPT

## 2023-08-07 PROCEDURE — 93306 TTE W/DOPPLER COMPLETE: CPT

## 2023-08-07 PROCEDURE — 82746 ASSAY OF FOLIC ACID SERUM: CPT

## 2023-08-07 PROCEDURE — 86803 HEPATITIS C AB TEST: CPT

## 2023-08-07 PROCEDURE — 84100 ASSAY OF PHOSPHORUS: CPT

## 2023-08-07 PROCEDURE — 81001 URINALYSIS AUTO W/SCOPE: CPT

## 2023-08-07 PROCEDURE — 93356 MYOCRD STRAIN IMG SPCKL TRCK: CPT

## 2023-08-07 PROCEDURE — 82962 GLUCOSE BLOOD TEST: CPT

## 2023-08-07 PROCEDURE — 82947 ASSAY GLUCOSE BLOOD QUANT: CPT

## 2023-08-07 PROCEDURE — 87641 MR-STAPH DNA AMP PROBE: CPT

## 2023-08-07 PROCEDURE — 84425 ASSAY OF VITAMIN B-1: CPT

## 2023-08-07 PROCEDURE — 80048 BASIC METABOLIC PNL TOTAL CA: CPT

## 2023-08-07 PROCEDURE — 83605 ASSAY OF LACTIC ACID: CPT

## 2023-08-07 PROCEDURE — 93971 EXTREMITY STUDY: CPT

## 2023-08-07 PROCEDURE — 87086 URINE CULTURE/COLONY COUNT: CPT

## 2023-08-07 PROCEDURE — 73080 X-RAY EXAM OF ELBOW: CPT

## 2023-08-07 PROCEDURE — 84295 ASSAY OF SERUM SODIUM: CPT

## 2023-08-07 PROCEDURE — 83036 HEMOGLOBIN GLYCOSYLATED A1C: CPT

## 2023-08-07 PROCEDURE — 70498 CT ANGIOGRAPHY NECK: CPT | Mod: MA

## 2023-08-07 PROCEDURE — 84207 ASSAY OF VITAMIN B-6: CPT

## 2023-08-07 PROCEDURE — 80053 COMPREHEN METABOLIC PANEL: CPT

## 2023-08-07 PROCEDURE — 99285 EMERGENCY DEPT VISIT HI MDM: CPT | Mod: 25

## 2023-08-07 PROCEDURE — 87640 STAPH A DNA AMP PROBE: CPT

## 2023-08-07 PROCEDURE — 96374 THER/PROPH/DIAG INJ IV PUSH: CPT

## 2023-08-07 PROCEDURE — 82435 ASSAY OF BLOOD CHLORIDE: CPT

## 2023-08-07 PROCEDURE — 71045 X-RAY EXAM CHEST 1 VIEW: CPT

## 2023-08-07 PROCEDURE — 85027 COMPLETE CBC AUTOMATED: CPT

## 2023-08-07 PROCEDURE — 86140 C-REACTIVE PROTEIN: CPT

## 2023-08-07 PROCEDURE — 82140 ASSAY OF AMMONIA: CPT

## 2023-08-07 PROCEDURE — 82306 VITAMIN D 25 HYDROXY: CPT

## 2023-08-07 PROCEDURE — 87040 BLOOD CULTURE FOR BACTERIA: CPT

## 2023-08-07 RX ORDER — PREGABALIN 225 MG/1
1 CAPSULE ORAL
Qty: 0 | Refills: 0 | DISCHARGE
Start: 2023-08-07

## 2023-08-07 RX ORDER — CEPHALEXIN 500 MG
1 CAPSULE ORAL
Qty: 28 | Refills: 0
Start: 2023-08-07 | End: 2023-08-13

## 2023-08-07 RX ORDER — ASPIRIN/CALCIUM CARB/MAGNESIUM 324 MG
1 TABLET ORAL
Qty: 1 | Refills: 0
Start: 2023-08-07

## 2023-08-07 RX ORDER — CHOLECALCIFEROL (VITAMIN D3) 125 MCG
2000 CAPSULE ORAL
Qty: 0 | Refills: 0 | DISCHARGE
Start: 2023-08-07

## 2023-08-07 RX ORDER — CHOLECALCIFEROL (VITAMIN D3) 125 MCG
2000 CAPSULE ORAL DAILY
Refills: 0 | Status: DISCONTINUED | OUTPATIENT
Start: 2023-08-07 | End: 2023-08-07

## 2023-08-07 RX ORDER — ASPIRIN/CALCIUM CARB/MAGNESIUM 324 MG
1 TABLET ORAL
Qty: 30 | Refills: 0
Start: 2023-08-07 | End: 2023-09-05

## 2023-08-07 RX ORDER — ASPIRIN/CALCIUM CARB/MAGNESIUM 324 MG
1 TABLET ORAL
Refills: 0 | DISCHARGE

## 2023-08-07 RX ADMIN — PREGABALIN 1000 MICROGRAM(S): 225 CAPSULE ORAL at 12:18

## 2023-08-07 RX ADMIN — Medication 2000 UNIT(S): at 12:17

## 2023-08-07 RX ADMIN — PANTOPRAZOLE SODIUM 40 MILLIGRAM(S): 20 TABLET, DELAYED RELEASE ORAL at 05:04

## 2023-08-07 RX ADMIN — Medication 100 MILLIGRAM(S): at 05:03

## 2023-08-07 RX ADMIN — CHLORHEXIDINE GLUCONATE 1 APPLICATION(S): 213 SOLUTION TOPICAL at 13:07

## 2023-08-07 RX ADMIN — Medication 100 MILLIGRAM(S): at 13:12

## 2023-08-07 RX ADMIN — Medication 325 MILLIGRAM(S): at 05:04

## 2023-08-07 NOTE — PROGRESS NOTE ADULT - REASON FOR ADMISSION
cellulitis, possible TIA

## 2023-08-07 NOTE — PROGRESS NOTE ADULT - PROBLEM SELECTOR PLAN 3
Pt with bilirubin of 1.4, mild increase from upper limit of 1.2.  -Trend w/ daily CMP
Pt with bilirubin of 1.4, mild increase from upper limit of 1.2.  -Trend w/ daily CMP
Pt with bilirubin of 1.4, mild increase from upper limit of 1.2.  -Trend w/ daily CMP - within normal limits 8/7
Pt with bilirubin of 1.6, mild increase from upper limit of 1.2.  -Trend w/ daily CMP

## 2023-08-07 NOTE — PROGRESS NOTE ADULT - ASSESSMENT
72y M no PMHx presenting for AMS initially c/f stroke as well as worsening painful RUE rash w/ leukocytosis to ~20k c/f cellulitis. Admitted to medicine for further management. 
Patient is a 72 year old male with no past medical history presenting after having difficulty using phone while attempting to contact physician daughter, episode lasting ~10 minutes and was admitted for AMS, rule out stroke and for RUE cellulitis.     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 but exam more consistent with bacterial etiology   - RUE erythema from forearm to above elbow within markings, warm, mild TTP, no open wounds/lesions    -- erythema appears improved --not as red today, still with edema and some TTP   - noted with PCN allergy with rash in childhood, tolerated cefazolin    - RUE duplex negative for DVT   - RUE xray with no sign of OM or effusion   - Bcx  NGTD     Recommendations:   Continue on cefazolin 2g IV Q8h for today   - if continues to improve - can look at discharging on cephalexin 500mg PO Q6h to complete 10d course    -- after discharge, will need close follow up -- can see me this week Thursday, office info provided   Neurology following  Monitor clinically, temps/WBC  Continue to elevate CIRILO Smith M.D.  [facility], Division of Infectious Diseases  [phone]  After 5pm on weekdays and all day on weekends - please call [phone]
Patient is a 72 year old male with no past medical history presenting after having difficulty using phone while attempting to contact physician daughter, episode lasting ~10 minutes and was admitted for AMS, rule out stroke and for RUE cellulitis.     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 but exam more consistent with bacterial etiology   - RUE erythema from forearm to above elbow within markings, warm, mild TTP, no open wounds/lesions    -- erythema appears improved --not as red today, still with edema and some TTP   - noted with PCN allergy with rash in childhood, tolerated cefazolin    - RUE duplex negative for DVT   - RUE xray with no sign of OM or effusion   - Bcx  NGTD     Recommendations:   Continue on cefazolin 2g IV Q8h while inpt   - can discharge on cephalexin 500mg PO Q6h to complete 10d course until 8/13    -- patient to follow up with me this Thursday 8/10, office info provided and office aware   Neurology following  Continue to elevate CIRILO Smith M.D.  [facility], Division of Infectious Diseases  [phone]  After 5pm on weekdays and all day on weekends - please call [phone]
Patient is a 72 year old male with no past medical history presenting after having difficulty using phone while attempting to contact physician daughter, episode lasting ~10 minutes and was admitted for AMS, rule out stroke and for RUE cellulitis.     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  [facility], Division of Infectious Diseases  [phone]  After 5pm on weekdays and all day on weekends - please call [phone]
72y M no PMHx presenting for AMS initially c/f stroke as well as worsening painful RUE rash w/ leukocytosis to ~20k c/f cellulitis. Admitted to medicine for further management. 

## 2023-08-07 NOTE — PROGRESS NOTE ADULT - SUBJECTIVE AND OBJECTIVE BOX
skeleton note PROGRESS NOTE:     Patient is a 72y old Male who presents with a chief complaint of cellulitis, possible TIA (05 Aug 2023 12:14)      SUBJECTIVE / OVERNIGHT EVENTS: No acute events overnight. This AM patient seen and examined at bedside. Pt. reports no SOB, chest pain, limb pain out of proportion to exam, sensory changes    ADDITIONAL REVIEW OF SYSTEMS: Negative     MEDICATIONS  (STANDING):  aspirin enteric coated 325 milliGRAM(s) Oral two times a day  ceFAZolin   IVPB 2000 milliGRAM(s) IV Intermittent every 8 hours  chlorhexidine 2% Cloths 1 Application(s) Topical daily  cyanocobalamin 1000 MICROGram(s) Oral daily  pantoprazole    Tablet 40 milliGRAM(s) Oral before breakfast  polyethylene glycol 3350 17 Gram(s) Oral daily  senna 1 Tablet(s) Oral daily    MEDICATIONS  (PRN):  acetaminophen     Tablet .. 650 milliGRAM(s) Oral every 6 hours PRN Temp greater or equal to 38C (100.4F), Mild Pain (1 - 3), Moderate Pain (4 - 6), Severe Pain (7 - 10)  melatonin 3 milliGRAM(s) Oral at bedtime PRN Insomnia      CAPILLARY BLOOD GLUCOSE        I&O's Summary    05 Aug 2023 07:01  -  06 Aug 2023 07:00  --------------------------------------------------------  IN: 700 mL / OUT: 0 mL / NET: 700 mL        PHYSICAL EXAM:  ICU Vital Signs Last 24 Hrs  T(C): 36.9 (07 Aug 2023 04:14), Max: 36.9 (07 Aug 2023 04:14)  T(F): 98.4 (07 Aug 2023 04:14), Max: 98.4 (07 Aug 2023 04:14)  HR: 58 (07 Aug 2023 04:14) (54 - 58)  BP: 119/79 (07 Aug 2023 04:14) (119/79 - 135/80)  BP(mean): --  ABP: --  ABP(mean): --  RR: 18 (07 Aug 2023 04:14) (18 - 18)  SpO2: 97% (07 Aug 2023 04:14) (97% - 98%)    O2 Parameters below as of 07 Aug 2023 04:14  Patient On (Oxygen Delivery Method): room air        Physical Examination:  General: no acute distress  HEENT: NC/AT, anicteric, neck supple  Respiratory: no acc muscle use, breathing comfortably  Cardiovascular: S1 and S2 present  Gastrointestinal: normal appearing, nondistended  Extremities: dec RUE edema, still with swelling at R elbow, decreased redness, full range of motion, no lymphadenopathy  Skin: RUE erythema appears further improved, still with erythema over elbow with TTP and warmth    LABS:                                         12.6   10.40 )-----------( 198      ( 07 Aug 2023 07:04 )             35.5   08-07    139  |  100  |  18  ----------------------------<  144<H>  3.6   |  21<L>  |  1.24    Ca    9.1      07 Aug 2023 07:10  Phos  3.3     08-07  Mg     2.1     08-07    TPro  6.8  /  Alb  3.5  /  TBili  0.4  /  DBili  x   /  AST  12  /  ALT  9<L>  /  AlkPhos  46  08-07          Urinalysis Basic - ( 06 Aug 2023 07:08 )    Color: x / Appearance: x / SG: x / pH: x  Gluc: 73 mg/dL / Ketone: x  / Bili: x / Urobili: x   Blood: x / Protein: x / Nitrite: x   Leuk Esterase: x / RBC: x / WBC x   Sq Epi: x / Non Sq Epi: x / Bacteria: x        Culture - Urine (collected 04 Aug 2023 01:22)  Source: Clean Catch Clean Catch (Midstream)  Final Report (04 Aug 2023 23:24):    <10,000 CFU/mL Normal Urogenital Ping    Culture - Blood (collected 04 Aug 2023 00:06)  Source: .Blood Blood-Peripheral  Preliminary Report (06 Aug 2023 04:01):    No growth at 48 Hours    Culture - Blood (collected 03 Aug 2023 23:52)  Source: .Blood Blood-Peripheral  Preliminary Report (06 Aug 2023 04:01):    No growth at 48 Hours        RADIOLOGY & ADDITIONAL TESTS:  Results Reviewed:   Imaging Personally Reviewed:  Electrocardiogram Personally Reviewed:    COORDINATION OF CARE:  Care Discussed with Consultants/Other Providers [Y/N]:  Prior or Outpatient Records Reviewed [Y/N]:

## 2023-08-07 NOTE — PROGRESS NOTE ADULT - SUBJECTIVE AND OBJECTIVE BOX
[facility] DIVISION OF INFECTIOUS DISEASES  REDD Escamilla Y. Patel, S. Shah, G. [facility]  [phone]  ([phone] - weekdays after 5pm and weekends)    Name: FERNANDO PARDO  Age/Gender: 72y Male  MRN: [id number]    Interval History:  Patient seen and examined this morning.   Feels better, erythema better, still with some edema and stiffness.  Has been elevating arm. Denies fever, chills or any new complaints.   Notes reviewed. No concerning overnight events  Afebrile   Allergies: penicillins (Rash)      Objective:  Vitals:   T(F): 98.4 (08-07-23 @ 04:14), Max: 98.4 (08-07-23 @ 04:14)  HR: 58 (08-07-23 @ 04:14) (54 - 67)  BP: 119/79 (08-07-23 @ 04:14) (119/79 - 135/80)  RR: 18 (08-07-23 @ 04:14) (18 - 18)  SpO2: 97% (08-07-23 @ 04:14) (97% - 98%)  Physical Examination:  General: no acute distress  HEENT: NC/AT, anicteric, neck supple  Respiratory: no acc muscle use, breathing comfortably  Cardiovascular: S1 and S2 present  Gastrointestinal: normal appearing, nondistended  Extremities: dec RUE edema overall  Skin: RUE erythema improved, mild erythema at elbow but less    Laboratory Studies:  CBC:                       12.6   10.40 )-----------( 198      ( 07 Aug 2023 07:04 )             35.5     WBC Trend:  10.40 08-07-23 @ 07:04  17.72 08-06-23 @ 07:08  16.32 08-05-23 @ 09:02  12.32 08-05-23 @ 06:29  17.21 08-04-23 @ 07:56  19.90 08-03-23 @ 23:14    CMP: 08-07    139  |  100  |  18  ----------------------------<  144<H>  3.6   |  21<L>  |  1.24    Ca    9.1      07 Aug 2023 07:10  Phos  3.3     08-07  Mg     2.1     08-07    TPro  6.8  /  Alb  3.5  /  TBili  0.4  /  DBili  x   /  AST  12  /  ALT  9<L>  /  AlkPhos  46  08-07    Creatinine: 1.24 mg/dL (08-07-23 @ 07:10)  Creatinine: 1.16 mg/dL (08-06-23 @ 07:08)  Creatinine: 1.20 mg/dL (08-05-23 @ 09:02)  Creatinine: 0.89 mg/dL (08-05-23 @ 06:28)  Creatinine: 1.18 mg/dL (08-04-23 @ 07:55)  Creatinine: 1.08 mg/dL (08-03-23 @ 23:14)      LIVER FUNCTIONS - ( 07 Aug 2023 07:10 )  Alb: 3.5 g/dL / Pro: 6.8 g/dL / ALK PHOS: 46 U/L / ALT: 9 U/L / AST: 12 U/L / GGT: x           Microbiology: reviewed   Culture - Urine (collected 08-04-23 @ 01:22)  Source: Clean Catch Clean Catch (Midstream)  Final Report (08-04-23 @ 23:24):    <10,000 CFU/mL Normal Urogenital Ping    Culture - Blood (collected 08-04-23 @ 00:06)  Source: .Blood Blood-Peripheral  Preliminary Report (08-07-23 @ 04:01):    No growth at 72 Hours    Culture - Blood (collected 08-03-23 @ 23:52)  Source: .Blood Blood-Peripheral  Preliminary Report (08-07-23 @ 04:00):    No growth at 72 Hours    Radiology: reviewed     Medications:  acetaminophen     Tablet .. 650 milliGRAM(s) Oral every 6 hours PRN  aspirin enteric coated 325 milliGRAM(s) Oral two times a day  ceFAZolin   IVPB 2000 milliGRAM(s) IV Intermittent every 8 hours  chlorhexidine 2% Cloths 1 Application(s) Topical daily  cholecalciferol 2000 Unit(s) Oral daily  cyanocobalamin 1000 MICROGram(s) Oral daily  melatonin 3 milliGRAM(s) Oral at bedtime PRN  pantoprazole    Tablet 40 milliGRAM(s) Oral before breakfast  polyethylene glycol 3350 17 Gram(s) Oral daily  senna 1 Tablet(s) Oral daily    Current Antimicrobials:  ceFAZolin   IVPB 2000 milliGRAM(s) IV Intermittent every 8 hours    Prior/Completed Antimicrobials:  ceFAZolin   IVPB

## 2023-08-07 NOTE — PROGRESS NOTE ADULT - PROBLEM SELECTOR PLAN 1
Area of erythema from elbow to distal forearm delineated by skin marker w/ associated edema.  -area of erythema was marked in the ED.   - fever 100.4 and  WBCs of ~20k -> fever resolved and WBC 17 after Tylenol.   -F/u blood cultures  -c/w ancef 1g q8h  -XR neg for effusion or osteo.  - will f/u ID reccs, MRSA swab
Area of erythema from elbow to distal forearm delineated by skin marker w/ associated edema.  -area of erythema was marked in the ED.   - fever 100.4 and  WBCs of ~20k.   -F/u blood cultures  -c/w ancef 2g q8h  -XR neg for effusion or osteo.
Area of erythema from elbow to distal forearm delineated by skin marker w/ associated edema.  -area of erythema was marked in the ED.   - fever 100.4 and  WBCs of ~20k.   -F/u blood cultures  -c/w ancef 2g q8h  -XR neg for effusion or osteo.
Area of erythema from elbow to distal forearm delineated by skin marker w/ associated edema.  -area of erythema was marked in the ED.   - fever 100.4 and  WBCs of ~20k -> improved to 10k   -F/u blood cultures - negative   -c/w ancef 2g q8h, will transition to cefalexin 500 mg q6 for 10d total per ID, f/u with Dr. Smith outpt.   -XR neg for effusion or osteo.

## 2023-08-07 NOTE — DISCHARGE NOTE NURSING/CASE MANAGEMENT/SOCIAL WORK - PATIENT PORTAL LINK FT
You can access the FollowMyHealth Patient Portal offered by [facility] by registering at the following website: [email or web address]. By joining [employer]’s FollowMyHealth portal, you will also be able to view your health information using other applications (apps) compatible with our system.

## 2023-08-07 NOTE — PROGRESS NOTE ADULT - PROBLEM SELECTOR PLAN 2
Pt with difficulty using familiar device ~10 minutes. Code stroke called, CTH completed w/o acute changes, but it did show chronic microangiopathic changes.   >DDx TIA vs. encephalopathy d/t sepsis.   -Neuro following, recs appreciated  >MRI brain w/o deferred per neurology  >Check TSH, vitamin B1, B6, B12, folate, lactate, Vitamin D 25 OH, creatinine kinase, ammonia,   >pt was taking full dose ASA at home, without physician's direction, for "prevention"  >start asa 81mg daily  >TTE, check tele for arrhythmia.
Pt with difficulty using familiar device ~10 minutes. Code stroke called, CTH completed w/o acute changes, but it did show chronic microangiopathic changes.   >DDx TIA vs. encephalopathy d/t sepsis.   -Neuro following, recs appreciated  >MRI brain w/o  >Check TSH, vitamin B1, B6, B12, folate, lactate, Vitamin D 25 OH, creatinine kinase, ammonia,   >pt was taking full dose ASA at home, without physician's direction, for "prevention"  >start asa 81mg daily  >TTE w/ bubble, check tele for arrhythmia.
Pt with difficulty using familiar device ~10 minutes. Code stroke called, CTH completed w/o acute changes, but it did show chronic microangiopathic changes.   >DDx TIA vs. encephalopathy d/t sepsis.   -Neuro following, recs appreciated  >MRI brain w/o deferred per neurology  >Check TSH, vitamin B1, B6, B12, folate, lactate, Vitamin D 25 OH, creatinine kinase, ammonia,   >pt was taking full dose ASA at home, without physician's direction, for "prevention"  >start asa 81mg daily  >TTE, check tele for arrhythmia.
Pt with difficulty using familiar device ~10 minutes. Code stroke called, CTH completed w/o acute changes, but it did show chronic microangiopathic changes.   >DDx TIA vs. encephalopathy d/t sepsis.   -Neuro following, recs appreciated  >MRI brain w/o deferred per neurology  >Check TSH, vitamin B1, B6, B12, folate, lactate, Vitamin D 25 OH, creatinine kinase, ammonia,   >pt was taking full dose ASA at home, without physician's direction, for "prevention"  >start asa 81mg daily  >TTE, check tele for arrhythmia.

## 2023-08-07 NOTE — PROGRESS NOTE ADULT - PROVIDER SPECIALTY LIST ADULT
Infectious Disease
Internal Medicine
Internal Medicine
Infectious Disease
Infectious Disease
Internal Medicine
Internal Medicine

## 2023-08-07 NOTE — DISCHARGE NOTE NURSING/CASE MANAGEMENT/SOCIAL WORK - NSDCPEFALRISK_GEN_ALL_CORE
For information on Fall & Injury Prevention, visit: [email or web address] OR  [email or web address] OR  https://www.cdc.gov/steadi/patient.html

## 2023-08-07 NOTE — PROGRESS NOTE ADULT - PROBLEM SELECTOR PROBLEM 2
Transient ischemic attack

## 2023-08-07 NOTE — PROGRESS NOTE ADULT - ATTENDING COMMENTS
Patient seen and examined at bedside. No acute events overnight. Right arm seems slightly better, but still with swelling and mld erythema. WBC downtrending and he has been afebrile over the weekend. Now completely back to baseline neurocognitively--likely transient symptoms from infection.    Plan to discharge on [facility]   Outpatient ID follow up 8/10    Discharge Time: 40 minutes Patient seen and examined at bedside. No acute events overnight. Right arm seems slightly better, but still with swelling and mld erythema. WBC downtrending and he has been afebrile over the weekend. Now completely back to baseline neurocognitively--likely transient symptoms from infection.    Plan to discharge on [facility]   Outpatient ID follow up 8/10    Discharge Time: 40 minutes.

## 2023-08-07 NOTE — DISCHARGE NOTE NURSING/CASE MANAGEMENT/SOCIAL WORK - NSDCFUADDAPPT_GEN_ALL_CORE_FT
Please follow up with your PCP in 2-4 weeks   Please call infectious disease office to get an appointment time for 8/10

## 2023-08-07 NOTE — PROGRESS NOTE ADULT - PROBLEM SELECTOR PLAN 4
DVT PPx: lovenox  Diet: Regular  Code status: Full code  Dispo: likely home
DVT PPx: lovenox  Diet: Regular  Code status: Full code  Dispo: home

## 2023-08-09 LAB
CULTURE RESULTS: SIGNIFICANT CHANGE UP
CULTURE RESULTS: SIGNIFICANT CHANGE UP
PYRIDOXAL PHOS SERPL-MCNC: 11.8 UG/L — SIGNIFICANT CHANGE UP (ref 3.4–65.2)
SPECIMEN SOURCE: SIGNIFICANT CHANGE UP
SPECIMEN SOURCE: SIGNIFICANT CHANGE UP

## 2023-08-10 LAB — VIT B1 SERPL-MCNC: 130.5 NMOL/L — SIGNIFICANT CHANGE UP (ref 66.5–200)
